# Patient Record
Sex: MALE | Race: OTHER | NOT HISPANIC OR LATINO
[De-identification: names, ages, dates, MRNs, and addresses within clinical notes are randomized per-mention and may not be internally consistent; named-entity substitution may affect disease eponyms.]

---

## 2020-09-24 VITALS
DIASTOLIC BLOOD PRESSURE: 72 MMHG | WEIGHT: 158.07 LBS | TEMPERATURE: 98 F | SYSTOLIC BLOOD PRESSURE: 135 MMHG | OXYGEN SATURATION: 97 %

## 2020-09-24 NOTE — H&P ADULT - ASSESSMENT
71 yo M with PMHx of HTN, HLD, thyroid cancer s/p thyroidectomy with XRT on 2018 with subsequent hypothyroidism (now on synthroid), h/o DVT R leg after flight to [facility] 2/2020 (on Xarelto with plan to complete 9 month course, last dose Thursday 9/24/2020; pt reports having a negative duplex US about 3wks ago), s/p cancerous cyst removal from back under general anesthesia in 2016 who presented to Cardiologist Dr. Ryan Coughlin with c/o CP/HZD on exertion, resolved with rest; however, denies CP/SOB to this provider.  Pt states he was recently dx with having a cancerous cyst on stomach, has appointment with surgeon on Monday 9/28 who will then set date for surgery under general anesthesia. Denies dizziness, diaphoresis, palpitations, fatigue, LE edema, orthopnea, PND, syncope, N/V, abdominal pain. NST 8/27/20: small area of anterior wall ischemia, LVEF 62%. ECHO per MD note: LVEF 57%, LVH, mild MR.    In light of pt's risk factors, abnormal NST, pt referred for cardiac cath with possible intervention to r/o underlying CAD.      - ASA II; Mallampati II  - VSS  - Pt is a candidate for moderate sedation.   - OF NOTE: pt on Xarelto 20mg PO QD since 2/2020 due to a R LE DVT; Pt reporting that he had a R lower extremity duplex US 2wks ago that showed that the DVT had resolved, and he had a plan to discontinue the Xarelto after the procedure (do not have collateral on this, only per patient); LAST DOSE: Thursday, 9/25/2020.   - ALLERGY: allergy to Shellfish (angioedema); pt not pre-medicated with Prednisone; Pt given SoluCortef 200mg IV x1 prior to cath, and Benadryl 50mg IVP x1 ordered to cath lab to be given prior to procedure.   - LOAD: pt not on ASA/Plavix at home; H/H stable; No recent bleeding; Pt given ASA 325mg PO x1 and Plavix 600mg PO x1.   - FLUID: pt euvolemic on exam w/ EF 57% via recent echo; pt started on NS 75cc/hr x4hrs.       Risks & benefits of procedure and alternative therapy have been explained to the patient including but not limited to: allergic reaction, bleeding w/possible need for blood transfusion, infection, renal and vascular compromise, limb damage, arrhythmia, stroke, vessel dissection/perforation, Myocardial infarction, emergent CABG. Informed consent obtained and in chart.

## 2020-09-24 NOTE — H&P ADULT - NSICDXPASTSURGICALHX_GEN_ALL_CORE_FT
PAST SURGICAL HISTORY:  S/P thyroid surgery 2018     PAST SURGICAL HISTORY:  History of skin surgery s/p excision of cancerous lesion on back under  general anesthesia in 2016    S/P thyroid surgery 2018

## 2020-09-24 NOTE — H&P ADULT - RS GEN PE MLT RESP DETAILS PC
clear to auscultation bilaterally/respirations non-labored/breath sounds equal/normal/airway patent/good air movement/no rales

## 2020-09-24 NOTE — H&P ADULT - HISTORY OF PRESENT ILLNESS
Skeleton  Verify meds    Cardiologist: Dr. Ryan Coughlin  Covid:  Pharmacy:  Escort:    71 yo M with PMHx of HTN, HLD, h/o thyroid surgery 2018 with subsequent hypothyroidism, h/o DVT R leg (2/2020, on Xarelto, last dose ____) who presented to Cardiologist Dr. Ryan Coughlin with c/o non-radiating LSCP described as __ and of _/10 intensity with associated HDZ upon ambulation of __ city blocks, resolving with rest, over past ____.     Denies dizziness, diaphoresis, palpitations, fatigue,, LE edema, orthopnea, PND, syncope, N/V, abdominal pain.     NST 8/27/20: small area of anterior wall ischemia, LVEF 62%. ECHO per MD note: LVEF 57%, LVH, mild MR.     In light of pt's risk factors, CCS Anginal Class ___ Sx, abnormal NST, pt referred for cardiac cath with possible intervention to r/o underlying CAD. Verify meds    Cardiologist: Dr. Ryan Coughlin  Covid test planned 9/26 AM @ [place] [place]  Pharmacy: [facility] [address] [facility]  Escort: No escort    Pt states he was recently dx with having a cancerous cyst on stomach, has appointment with surgeon on Monday 9/28 who will then set date for surgery under general anesthesia. Patient unsure if he will come in for cath on Tuesday because he would like to address cancerous lesion first. Explained to patient that he would likely need cardiac clearance prior to going under general anesthesia. Patient will reach out to Dr. Coughlin to discuss and will also discuss with surgeon at Monday appointment prior to making decision.    71 yo M with PMHx of HTN, HLD, thyroid cancer s/p thyroidectomy with XRT on 2018 with subsequent hypothyroidism (now on synthroid), h/o DVT R leg after flight to [facility] 2/2020 (on Xarelto with plan to complete 9 month course, last dose Saturday 9/26 PM prior to cath), s/p cancerous cyst removal from back under general anesthesia in 2016 who presented to Cardiologist Dr. Ryan Coughlin with c/o CP/HDZ on exertion, resolved with rest; however, denies CP/SOB to this provider.  Pt states he was recently dx with having a cancerous cyst on stomach, has appointment with surgeon on Monday 9/28 who will then set date for surgery under general anesthesia. Denies dizziness, diaphoresis, palpitations, fatigue, LE edema, orthopnea, PND, syncope, N/V, abdominal pain. NST 8/27/20: small area of anterior wall ischemia, LVEF 62%. ECHO per MD note: LVEF 57%, LVH, mild MR.  In light of pt's risk factors, abnormal NST, pt referred for cardiac cath with possible intervention to r/o underlying CAD. Verify meds    **Allergy to shellfish -->angioedema, pt unsure of whether he will come in for procedure of not, premedicate prior to procedure **    Cardiologist: Dr. Ryan Coughlin  Covid test planned 9/26 AM @ [place]  Pharmacy: [facility] [address] [facility]  Escort: No escort    Pt states he was recently dx with having a cancerous cyst on stomach, has appointment with surgeon on Monday 9/28 who will then set date for surgery under general anesthesia. Patient unsure if he will come in for cath on Tuesday because he would like to address cancerous lesion first. Explained to patient that he would likely need cardiac clearance prior to going under general anesthesia. Patient will reach out to Dr. Coughlin to discuss and will also discuss with surgeon at Monday appointment prior to making decision.    71 yo M with PMHx of HTN, HLD, thyroid cancer s/p thyroidectomy with XRT on 2018 with subsequent hypothyroidism (now on synthroid), h/o DVT R leg after flight to [facility] 2/2020 (on Xarelto with plan to complete 9 month course, last dose Saturday 9/26 PM prior to cath), s/p cancerous cyst removal from back under general anesthesia in 2016 who presented to Cardiologist Dr. Ryan Coughlin with c/o CP/HDZ on exertion, resolved with rest; however, denies CP/SOB to this provider.  Pt states he was recently dx with having a cancerous cyst on stomach, has appointment with surgeon on Monday 9/28 who will then set date for surgery under general anesthesia. Denies dizziness, diaphoresis, palpitations, fatigue, LE edema, orthopnea, PND, syncope, N/V, abdominal pain. NST 8/27/20: small area of anterior wall ischemia, LVEF 62%. ECHO per MD note: LVEF 57%, LVH, mild MR.  In light of pt's risk factors, abnormal NST, pt referred for cardiac cath with possible intervention to r/o underlying CAD.   **Allergy to shellfish -->angioedema, pt unsure of whether he will come in for procedure of not, premedicate prior to procedure **    Cardiologist: Dr. Ryan Coughlin  Covid test planned 9/26 AM @ [place]  Pharmacy: [facility] [address] [facility]  Escort: No escort    Pt states he was recently dx with having a cancerous cyst on stomach, has appointment with surgeon on Monday 9/28 who will then set date for surgery under general anesthesia. Patient unsure if he will come in for cath on Tuesday because he would like to address cancerous lesion first. Explained to patient that he would likely need cardiac clearance prior to going under general anesthesia. Patient will reach out to Dr. Coughlin to discuss and will also discuss with surgeon at Monday appointment prior to making decision.    69 yo M with PMHx of HTN, HLD, thyroid cancer s/p thyroidectomy with XRT on 2018 with subsequent hypothyroidism (now on synthroid), h/o DVT R leg after flight to [facility] 2/2020 (on Xarelto with plan to complete 9 month course, last dose Saturday 9/26 PM prior to cath), s/p cancerous cyst removal from back under general anesthesia in 2016 who presented to Cardiologist Dr. Ryan Coughlin with c/o CP/HDZ on exertion, resolved with rest; however, denies CP/SOB to this provider.  Pt states he was recently dx with having a cancerous cyst on stomach, has appointment with surgeon on Monday 9/28 who will then set date for surgery under general anesthesia. Denies dizziness, diaphoresis, palpitations, fatigue, LE edema, orthopnea, PND, syncope, N/V, abdominal pain. NST 8/27/20: small area of anterior wall ischemia, LVEF 62%. ECHO per MD note: LVEF 57%, LVH, mild MR.  In light of pt's risk factors, abnormal NST, pt referred for cardiac cath with possible intervention to r/o underlying CAD. **Allergy to shellfish (angioedema) --> patient did not get pre-medicated with prednisone     Cardiologist: Dr. Ryan Coughlin  Covid test planned 9/26 AM @ [place]  Pharmacy: [facility] [address] [facility]  Escort: No escort    71 yo M with PMHx of HTN, HLD, thyroid cancer s/p thyroidectomy with XRT on 2018 with subsequent hypothyroidism (now on synthroid), h/o DVT R leg after flight to [facility] 2/2020 (on Xarelto with plan to complete 9 month course, last dose Thursday 9/24/2020; pt reports having a negative duplex US about 3wks ago), s/p cancerous cyst removal from back under general anesthesia in 2016 who presented to Cardiologist Dr. Ryan Coughlin with c/o CP/HDZ on exertion, resolved with rest; however, denies CP/SOB to this provider.  Pt states he was recently dx with having a cancerous cyst on stomach, has appointment with surgeon on Monday 9/28 who will then set date for surgery under general anesthesia. Denies dizziness, diaphoresis, palpitations, fatigue, LE edema, orthopnea, PND, syncope, N/V, abdominal pain. NST 8/27/20: small area of anterior wall ischemia, LVEF 62%. ECHO per MD note: LVEF 57%, LVH, mild MR.      In light of pt's risk factors, abnormal NST, pt referred for cardiac cath with possible intervention to r/o underlying CAD.    **OF NOTE: pt initially reported he was getting surgery for a "cancerous cyst on the stomach." Upon further discussion, patient has a cyst in his skin around his upper abdomen. Patient reporting that he had a similar situation on his back in 2016, and it was removed in an officer under local anesthetic. He reports that he does not have plans to have it removed right now.

## 2020-09-24 NOTE — H&P ADULT - NSHPLABSRESULTS_GEN_ALL_CORE
17.9   6.56  )-----------( 127      ( 29 Sep 2020 09:47 )             52.8     139  |  101  |  18  ----------------------------<  128<H>  3.9   |  28  |  0.95    Ca    9.1      29 Sep 2020 09:47    TPro  7.6  /  Alb  4.3  /  TBili  0.6  /  DBili  x   /  AST  24  /  ALT  30  /  AlkPhos  67  09-29      PT/INR - ( 29 Sep 2020 09:47 )   PT: 12.0 sec;   INR: 1.00          PTT - ( 29 Sep 2020 09:47 )  PTT:29.5 sec    CARDIAC MARKERS ( 29 Sep 2020 09:47 )  x     / x     / 130 U/L / x     / 5.4 ng/mL        EKG: NSR w/ TWI in III

## 2020-09-24 NOTE — H&P ADULT - NSICDXPASTMEDICALHX_GEN_ALL_CORE_FT
PAST MEDICAL HISTORY:  DVT (deep venous thrombosis) R, dx 2/2020    Hyperlipidemia     Hypertension     Hypothyroid

## 2020-09-24 NOTE — H&P ADULT - NSICDXFAMILYHX_GEN_ALL_CORE_FT
FAMILY HISTORY:  FH: heart disease, Mother     No pertinent family history in first degree relatives

## 2020-09-26 ENCOUNTER — APPOINTMENT (OUTPATIENT)
Dept: DISASTER EMERGENCY | Facility: CLINIC | Age: 70
End: 2020-09-26

## 2020-09-26 LAB — SARS-COV-2 N GENE NPH QL NAA+PROBE: NOT DETECTED

## 2020-09-29 ENCOUNTER — TRANSCRIPTION ENCOUNTER (OUTPATIENT)
Age: 70
End: 2020-09-29

## 2020-09-29 ENCOUNTER — INPATIENT (INPATIENT)
Facility: HOSPITAL | Age: 70
LOS: 6 days | Discharge: HOME | DRG: 232 | End: 2020-10-06
Attending: THORACIC SURGERY (CARDIOTHORACIC VASCULAR SURGERY) | Admitting: THORACIC SURGERY (CARDIOTHORACIC VASCULAR SURGERY)
Payer: MEDICARE

## 2020-09-29 DIAGNOSIS — E89.0 POSTPROCEDURAL HYPOTHYROIDISM: ICD-10-CM

## 2020-09-29 DIAGNOSIS — Z92.3 PERSONAL HISTORY OF IRRADIATION: ICD-10-CM

## 2020-09-29 DIAGNOSIS — E86.1 HYPOVOLEMIA: ICD-10-CM

## 2020-09-29 DIAGNOSIS — E87.2 ACIDOSIS: ICD-10-CM

## 2020-09-29 DIAGNOSIS — R09.89 OTHER SPECIFIED SYMPTOMS AND SIGNS INVOLVING THE CIRCULATORY AND RESPIRATORY SYSTEMS: ICD-10-CM

## 2020-09-29 DIAGNOSIS — Z91.013 ALLERGY TO SEAFOOD: ICD-10-CM

## 2020-09-29 DIAGNOSIS — E78.5 HYPERLIPIDEMIA, UNSPECIFIED: ICD-10-CM

## 2020-09-29 DIAGNOSIS — Z86.718 PERSONAL HISTORY OF OTHER VENOUS THROMBOSIS AND EMBOLISM: ICD-10-CM

## 2020-09-29 DIAGNOSIS — I25.10 ATHEROSCLEROTIC HEART DISEASE OF NATIVE CORONARY ARTERY WITHOUT ANGINA PECTORIS: ICD-10-CM

## 2020-09-29 DIAGNOSIS — Z80.9 FAMILY HISTORY OF MALIGNANT NEOPLASM, UNSPECIFIED: ICD-10-CM

## 2020-09-29 DIAGNOSIS — I10 ESSENTIAL (PRIMARY) HYPERTENSION: ICD-10-CM

## 2020-09-29 DIAGNOSIS — R00.1 BRADYCARDIA, UNSPECIFIED: ICD-10-CM

## 2020-09-29 DIAGNOSIS — Z85.850 PERSONAL HISTORY OF MALIGNANT NEOPLASM OF THYROID: ICD-10-CM

## 2020-09-29 DIAGNOSIS — Z85.828 PERSONAL HISTORY OF OTHER MALIGNANT NEOPLASM OF SKIN: ICD-10-CM

## 2020-09-29 DIAGNOSIS — I25.119 ATHEROSCLEROTIC HEART DISEASE OF NATIVE CORONARY ARTERY WITH UNSPECIFIED ANGINA PECTORIS: ICD-10-CM

## 2020-09-29 DIAGNOSIS — Z98.890 OTHER SPECIFIED POSTPROCEDURAL STATES: Chronic | ICD-10-CM

## 2020-09-29 LAB
A1C WITH ESTIMATED AVERAGE GLUCOSE RESULT: 6.2 % — HIGH (ref 4–5.6)
ALBUMIN SERPL ELPH-MCNC: 4.3 G/DL — SIGNIFICANT CHANGE UP (ref 3.3–5)
ALP SERPL-CCNC: 67 U/L — SIGNIFICANT CHANGE UP (ref 40–120)
ALT FLD-CCNC: 30 U/L — SIGNIFICANT CHANGE UP (ref 10–45)
ANION GAP SERPL CALC-SCNC: 10 MMOL/L — SIGNIFICANT CHANGE UP (ref 5–17)
APTT BLD: 29.5 SEC — SIGNIFICANT CHANGE UP (ref 27.5–35.5)
AST SERPL-CCNC: 24 U/L — SIGNIFICANT CHANGE UP (ref 10–40)
BASOPHILS # BLD AUTO: 0.02 K/UL — SIGNIFICANT CHANGE UP (ref 0–0.2)
BASOPHILS NFR BLD AUTO: 0.3 % — SIGNIFICANT CHANGE UP (ref 0–2)
BILIRUB SERPL-MCNC: 0.6 MG/DL — SIGNIFICANT CHANGE UP (ref 0.2–1.2)
BLD GP AB SCN SERPL QL: NEGATIVE — SIGNIFICANT CHANGE UP
BLD GP AB SCN SERPL QL: NEGATIVE — SIGNIFICANT CHANGE UP
BUN SERPL-MCNC: 18 MG/DL — SIGNIFICANT CHANGE UP (ref 7–23)
CALCIUM SERPL-MCNC: 9.1 MG/DL — SIGNIFICANT CHANGE UP (ref 8.4–10.5)
CHLORIDE SERPL-SCNC: 101 MMOL/L — SIGNIFICANT CHANGE UP (ref 96–108)
CHOLEST SERPL-MCNC: 167 MG/DL — SIGNIFICANT CHANGE UP (ref 10–199)
CK MB CFR SERPL CALC: 5.4 NG/ML — SIGNIFICANT CHANGE UP (ref 0–6.7)
CK SERPL-CCNC: 130 U/L — SIGNIFICANT CHANGE UP (ref 30–200)
CO2 SERPL-SCNC: 28 MMOL/L — SIGNIFICANT CHANGE UP (ref 22–31)
CREAT SERPL-MCNC: 0.95 MG/DL — SIGNIFICANT CHANGE UP (ref 0.5–1.3)
CRP SERPL-MCNC: 0.17 MG/DL — SIGNIFICANT CHANGE UP (ref 0–0.4)
EOSINOPHIL # BLD AUTO: 0.08 K/UL — SIGNIFICANT CHANGE UP (ref 0–0.5)
EOSINOPHIL NFR BLD AUTO: 1.2 % — SIGNIFICANT CHANGE UP (ref 0–6)
ESTIMATED AVERAGE GLUCOSE: 131 MG/DL — HIGH (ref 68–114)
GLUCOSE SERPL-MCNC: 128 MG/DL — HIGH (ref 70–99)
HCT VFR BLD CALC: 52.8 % — HIGH (ref 39–50)
HDLC SERPL-MCNC: 38 MG/DL — LOW
HGB BLD-MCNC: 17.9 G/DL — HIGH (ref 13–17)
IMM GRANULOCYTES NFR BLD AUTO: 0.3 % — SIGNIFICANT CHANGE UP (ref 0–1.5)
INR BLD: 1 — SIGNIFICANT CHANGE UP (ref 0.88–1.16)
LIPID PNL WITH DIRECT LDL SERPL: 98 MG/DL — SIGNIFICANT CHANGE UP
LYMPHOCYTES # BLD AUTO: 1.98 K/UL — SIGNIFICANT CHANGE UP (ref 1–3.3)
LYMPHOCYTES # BLD AUTO: 30.2 % — SIGNIFICANT CHANGE UP (ref 13–44)
MCHC RBC-ENTMCNC: 33 PG — SIGNIFICANT CHANGE UP (ref 27–34)
MCHC RBC-ENTMCNC: 33.9 GM/DL — SIGNIFICANT CHANGE UP (ref 32–36)
MCV RBC AUTO: 97.4 FL — SIGNIFICANT CHANGE UP (ref 80–100)
MONOCYTES # BLD AUTO: 0.67 K/UL — SIGNIFICANT CHANGE UP (ref 0–0.9)
MONOCYTES NFR BLD AUTO: 10.2 % — SIGNIFICANT CHANGE UP (ref 2–14)
NEUTROPHILS # BLD AUTO: 3.79 K/UL — SIGNIFICANT CHANGE UP (ref 1.8–7.4)
NEUTROPHILS NFR BLD AUTO: 57.8 % — SIGNIFICANT CHANGE UP (ref 43–77)
NRBC # BLD: 0 /100 WBCS — SIGNIFICANT CHANGE UP (ref 0–0)
PLATELET # BLD AUTO: 127 K/UL — LOW (ref 150–400)
POTASSIUM SERPL-MCNC: 3.9 MMOL/L — SIGNIFICANT CHANGE UP (ref 3.5–5.3)
POTASSIUM SERPL-SCNC: 3.9 MMOL/L — SIGNIFICANT CHANGE UP (ref 3.5–5.3)
PROT SERPL-MCNC: 7.6 G/DL — SIGNIFICANT CHANGE UP (ref 6–8.3)
PROTHROM AB SERPL-ACNC: 12 SEC — SIGNIFICANT CHANGE UP (ref 10.6–13.6)
RBC # BLD: 5.42 M/UL — SIGNIFICANT CHANGE UP (ref 4.2–5.8)
RBC # FLD: 12.9 % — SIGNIFICANT CHANGE UP (ref 10.3–14.5)
RH IG SCN BLD-IMP: POSITIVE — SIGNIFICANT CHANGE UP
RH IG SCN BLD-IMP: POSITIVE — SIGNIFICANT CHANGE UP
SODIUM SERPL-SCNC: 139 MMOL/L — SIGNIFICANT CHANGE UP (ref 135–145)
TOTAL CHOLESTEROL/HDL RATIO MEASUREMENT: 4.4 RATIO — SIGNIFICANT CHANGE UP (ref 3.4–9.6)
TRIGL SERPL-MCNC: 154 MG/DL — HIGH (ref 10–149)
WBC # BLD: 6.56 K/UL — SIGNIFICANT CHANGE UP (ref 3.8–10.5)
WBC # FLD AUTO: 6.56 K/UL — SIGNIFICANT CHANGE UP (ref 3.8–10.5)

## 2020-09-29 PROCEDURE — 99223 1ST HOSP IP/OBS HIGH 75: CPT | Mod: 57

## 2020-09-29 PROCEDURE — 93458 L HRT ARTERY/VENTRICLE ANGIO: CPT | Mod: 26

## 2020-09-29 PROCEDURE — 93010 ELECTROCARDIOGRAM REPORT: CPT

## 2020-09-29 PROCEDURE — 94010 BREATHING CAPACITY TEST: CPT | Mod: 26

## 2020-09-29 PROCEDURE — 71045 X-RAY EXAM CHEST 1 VIEW: CPT | Mod: 26

## 2020-09-29 RX ORDER — SODIUM CHLORIDE 9 MG/ML
500 INJECTION INTRAMUSCULAR; INTRAVENOUS; SUBCUTANEOUS
Refills: 0 | Status: DISCONTINUED | OUTPATIENT
Start: 2020-09-29 | End: 2020-09-29

## 2020-09-29 RX ORDER — METOPROLOL TARTRATE 50 MG
12.5 TABLET ORAL EVERY 12 HOURS
Refills: 0 | Status: DISCONTINUED | OUTPATIENT
Start: 2020-09-29 | End: 2020-10-02

## 2020-09-29 RX ORDER — CHLORHEXIDINE GLUCONATE 213 G/1000ML
1 SOLUTION TOPICAL ONCE
Refills: 0 | Status: COMPLETED | OUTPATIENT
Start: 2020-09-29 | End: 2020-09-29

## 2020-09-29 RX ORDER — PANTOPRAZOLE SODIUM 20 MG/1
40 TABLET, DELAYED RELEASE ORAL
Refills: 0 | Status: DISCONTINUED | OUTPATIENT
Start: 2020-09-29 | End: 2020-09-30

## 2020-09-29 RX ORDER — CLOPIDOGREL BISULFATE 75 MG/1
600 TABLET, FILM COATED ORAL ONCE
Refills: 0 | Status: COMPLETED | OUTPATIENT
Start: 2020-09-29 | End: 2020-09-29

## 2020-09-29 RX ORDER — ATORVASTATIN CALCIUM 80 MG/1
40 TABLET, FILM COATED ORAL AT BEDTIME
Refills: 0 | Status: DISCONTINUED | OUTPATIENT
Start: 2020-09-29 | End: 2020-09-30

## 2020-09-29 RX ORDER — SODIUM CHLORIDE 9 MG/ML
500 INJECTION INTRAMUSCULAR; INTRAVENOUS; SUBCUTANEOUS
Refills: 0 | Status: DISCONTINUED | OUTPATIENT
Start: 2020-09-29 | End: 2020-09-30

## 2020-09-29 RX ORDER — CHLORHEXIDINE GLUCONATE 213 G/1000ML
1 SOLUTION TOPICAL ONCE
Refills: 0 | Status: COMPLETED | OUTPATIENT
Start: 2020-09-30 | End: 2020-09-30

## 2020-09-29 RX ORDER — CHLORHEXIDINE GLUCONATE 213 G/1000ML
1 SOLUTION TOPICAL ONCE
Refills: 0 | Status: DISCONTINUED | OUTPATIENT
Start: 2020-09-29 | End: 2020-09-29

## 2020-09-29 RX ORDER — ASPIRIN/CALCIUM CARB/MAGNESIUM 324 MG
325 TABLET ORAL ONCE
Refills: 0 | Status: COMPLETED | OUTPATIENT
Start: 2020-09-29 | End: 2020-09-29

## 2020-09-29 RX ORDER — CHLORHEXIDINE GLUCONATE 213 G/1000ML
10 SOLUTION TOPICAL ONCE
Refills: 0 | Status: COMPLETED | OUTPATIENT
Start: 2020-09-29 | End: 2020-09-30

## 2020-09-29 RX ORDER — LEVOTHYROXINE SODIUM 125 MCG
125 TABLET ORAL DAILY
Refills: 0 | Status: DISCONTINUED | OUTPATIENT
Start: 2020-09-30 | End: 2020-10-06

## 2020-09-29 RX ORDER — ASPIRIN/CALCIUM CARB/MAGNESIUM 324 MG
81 TABLET ORAL DAILY
Refills: 0 | Status: DISCONTINUED | OUTPATIENT
Start: 2020-09-30 | End: 2020-10-06

## 2020-09-29 RX ORDER — DIPHENHYDRAMINE HCL 50 MG
50 CAPSULE ORAL ONCE
Refills: 0 | Status: DISCONTINUED | OUTPATIENT
Start: 2020-09-29 | End: 2020-09-30

## 2020-09-29 RX ORDER — HYDROCORTISONE 20 MG
200 TABLET ORAL ONCE
Refills: 0 | Status: COMPLETED | OUTPATIENT
Start: 2020-09-29 | End: 2020-09-29

## 2020-09-29 RX ADMIN — SODIUM CHLORIDE 75 MILLILITER(S): 9 INJECTION INTRAMUSCULAR; INTRAVENOUS; SUBCUTANEOUS at 14:06

## 2020-09-29 RX ADMIN — CLOPIDOGREL BISULFATE 600 MILLIGRAM(S): 75 TABLET, FILM COATED ORAL at 10:46

## 2020-09-29 RX ADMIN — ATORVASTATIN CALCIUM 40 MILLIGRAM(S): 80 TABLET, FILM COATED ORAL at 22:25

## 2020-09-29 RX ADMIN — CHLORHEXIDINE GLUCONATE 1 APPLICATION(S): 213 SOLUTION TOPICAL at 22:27

## 2020-09-29 RX ADMIN — Medication 200 MILLIGRAM(S): at 11:13

## 2020-09-29 RX ADMIN — SODIUM CHLORIDE 75 MILLILITER(S): 9 INJECTION INTRAMUSCULAR; INTRAVENOUS; SUBCUTANEOUS at 10:47

## 2020-09-29 RX ADMIN — Medication 12.5 MILLIGRAM(S): at 18:12

## 2020-09-29 RX ADMIN — Medication 325 MILLIGRAM(S): at 10:46

## 2020-09-29 RX ADMIN — CHLORHEXIDINE GLUCONATE 1 APPLICATION(S): 213 SOLUTION TOPICAL at 18:13

## 2020-09-29 NOTE — CONSULT NOTE ADULT - ASSESSMENT
69 y/o male with PMHx of HTN, HLD, thyroid cancer s/p thyroidectomy with XRT on 2018 with subsequent hypothyroidism (now on synthroid), h/o DVT R leg after flight to [facility] 2/2020 (on Xarelto with plan to complete 9 month course, last dose Thursday 9/24/2020; pt reports having a negative duplex US about 3wks ago. Patient states presented to hospital today asymptomatic for routine testing found to have abnormal stress test and triple vessel disease on cardiac cath- Dr. Clark consulted for possible hybrid procedure.       Triple Vessel CAD  -Orders placed and pending including bedside spirometry, TTE, Carotid Duplex, bloodwork, preoperative preparation  -Will order beta-blocker and Protonix pre-operatively  -Continue Lipitor 40  -Possible Mid-cab this week pending patient consent.   -Hybrid procedure with stenting this admission vs. later admission    Plan discussed with Dr. Clark

## 2020-09-29 NOTE — CHART NOTE - NSCHARTNOTEFT_GEN_A_CORE
CARDIOLOGY FELLOW BEDSIDE ECHO    Called to do a limited bedside echo to evaluate LVEF and valvular function prior to patient's midCAB scheduled for tomorrow. My preliminary read:    - normal LVEF  - trace AI  - LVH  - mild TR  - normal RV function  - no pericardial effusion    Please refer to the official attending read to follow in the morning in [place].    --  Roger Grover MD  Cardiology PGY5

## 2020-09-29 NOTE — CONSULT NOTE ADULT - SUBJECTIVE AND OBJECTIVE BOX
Surgeon: Dr. Clark    Requesting Physician: Dr. Coughlin     HISTORY OF PRESENT ILLNESS :  71 yo M with PMHx of HTN, HLD, thyroid cancer s/p thyroidectomy with XRT on 2018 with subsequent hypothyroidism (now on synthroid), h/o DVT R leg after flight to [facility] 2020 (on Xarelto with plan to complete 9 month course, last dose 2020; pt reports having a negative duplex US about 3wks ago. Patient states presented to hospital today for routine stress test, denies history of syncope, chest pain, palpitations, dyspnea, HDZ, Orthopnea, or changes to ADL or daily routines. Patient found to have abnormal stress test, presented for cardiac catheterization. Patient found to have triple vessel disease- Dr. Clark consulted for possible hybrid procedure.     Patient seen and examined s/p catheterization. Patient denies current dizziness, discomfort, chest pain, dyspnea, orthopnea, n/v/d swelling to extremities or calf tenderness.         PAST MEDICAL & SURGICAL HISTORY:  Hypothyroid  DVT (deep venous thrombosis)  R, dx 2020  Hyperlipidemia  Hypertension  History of skin surgery  s/p excision of (?) cancerous lesion on back under general anesthesia in 2016  S/P thyroid surgery  2018        MEDICATIONS  (STANDING):  atorvastatin 40 milliGRAM(s) Oral at bedtime  diphenhydrAMINE   Injectable 50 milliGRAM(s) IV Push Once  sodium chloride 0.9%. 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, works in airport security   Assisted device use (Cane / Walker): none  Live with: wife    FAMILY HISTORY:  Mother- , of cancer  Father - , pnemonia complication  sister- , cancer      Review of Systems (Need 10):  CONSTITUTIONAL: Denies fevers / chills, sweats, fatigue, weight loss, weight gain                                       NEURO:  Denies parathesias, seizures, syncope, confusion                                                                                  EYES:  Denies blurry vision, discharge, pain, loss of vision                                                                                    ENMT:  Denies difficulty hearing, vertigo, dysphagia, epistaxis, recent dental work                                       CV:  Denies chest pain, palpitations, HDZ, orthopnea                                                                                           RESPIRATORY:  Denies wWheezing, SOB, cough / sputum, hemoptysis                                                               GI:  Denies nausea, vomiting, diarrhea, constipation, melena                                                                          : Denies hematuria, dysuria, urgency, incontinence                                                                                          MUSKULOSKELETAL:  Denies arthritis, joint swelling, muscle weakness                                                             SKIN:  Denies rash, itching, hair loss, masses                                                                                                                                                                                     HEME/LYMPH:  Denies bruises easily,                                                                                 Vital Signs Last 24 Hrs  T(C): --  T(F): --  HR: --  BP: --  BP(mean): --  RR: --  SpO2: --    Physical Exam (Need 8)  Neuro: A+O x 3, non-focal, speech clear and intact  HEENT: PERRL, EOMI, oral mucosa pink and moist  Neck: supple, no JVD  CV: regular rate, regular rhythm, +S1S2, no murmurs or rub  Pulm/chest: lung sounds CTA and equal bilaterally, no accessory muscle use noted  Abd: soft, NT, ND, +BS  Ext: PALACIOS x 4, no C/C/E radial pressure dressing in place, groins soft, nontender, no hematoma noted  Skin: warm, well perfused, no rashes                                                           LABS:                        17.9   6.56  )-----------( 127      ( 29 Sep 2020 09:47 )             52.8     09-    139  |  101  |  18  ----------------------------<  128<H>  3.9   |  28  |  0.95    Ca    9.1      29 Sep 2020 09:47    TPro  7.6  /  Alb  4.3  /  TBili  0.6  /  DBili  x   /  AST  24  /  ALT  30  /  AlkPhos  67  09-29    PT/INR - ( 29 Sep 2020 09:47 )   PT: 12.0 sec;   INR: 1.00          PTT - ( 29 Sep 2020 09:47 )  PTT:29.5 sec    CARDIAC MARKERS ( 29 Sep 2020 09:47 )  x     / x     / 130 U/L / x     / 5.4 ng/mL        Pre-operative testing  ____________    On Beta Blocker? YES / NO / CONTRAINDICATED Reason_______________    Hgb A1C: 6.2    EKG: pending    CXR: pending    Cath Report: pending    Echo: pending     PFT's: pending    Carotid Duplex: pending      At this time patent awaiting final decision for mid-cab surgery.  Consult in Chart?  YES  Consent in Chart? NO  Pre-op Orders Placed? YES   Blood Prodeucts Ordered? YES  NPO ordered? YES Surgeon: Dr. Clark    Requesting Physician: Dr. Coughlin     HISTORY OF PRESENT ILLNESS :  71 yo M with PMHx of HTN, HLD, thyroid cancer s/p thyroidectomy with XRT on 2018 with subsequent hypothyroidism (now on synthroid), h/o DVT R leg after flight to [facility] 2020 (on Xarelto with plan to complete 9 month course, last dose 2020; pt reports having a negative duplex US about 3wks ago. Patient states presented to hospital today for routine stress test, denies history of syncope, chest pain, palpitations, dyspnea, HDZ, Orthopnea, or changes to ADL or daily routines. Patient found to have abnormal stress test, presented for cardiac catheterization. Patient found to have triple vessel disease- Dr. Clark consulted for possible hybrid procedure.     Patient seen and examined s/p catheterization. Patient denies current dizziness, discomfort, chest pain, dyspnea, orthopnea, n/v/d swelling to extremities or calf tenderness.         PAST MEDICAL & SURGICAL HISTORY:  Hypothyroid  DVT (deep venous thrombosis)  R, dx 2020  Hyperlipidemia  Hypertension  History of skin surgery  s/p excision of (?) cancerous lesion on back under general anesthesia in 2016  S/P thyroid surgery  2018        MEDICATIONS  (STANDING):  atorvastatin 40 milliGRAM(s) Oral at bedtime  diphenhydrAMINE   Injectable 50 milliGRAM(s) IV Push Once  sodium chloride 0.9%. 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, works in airport security   Assisted device use (Cane / Walker): none  Live with: wife    FAMILY HISTORY:  Mother- , of cancer  Father - , pnemonia complication  sister- , cancer      Review of Systems (Need 10):  CONSTITUTIONAL: Denies fevers / chills, sweats, fatigue, weight loss, weight gain                                       NEURO:  Denies parathesias, seizures, syncope, confusion                                                                                  EYES:  Denies blurry vision, discharge, pain, loss of vision                                                                                    ENMT:  Denies difficulty hearing, vertigo, dysphagia, epistaxis, recent dental work                                       CV:  Denies chest pain, palpitations, HDZ, orthopnea                                                                                           RESPIRATORY:  Denies wWheezing, SOB, cough / sputum, hemoptysis                                                               GI:  Denies nausea, vomiting, diarrhea, constipation, melena                                                                          : Denies hematuria, dysuria, urgency, incontinence                                                                                          MUSKULOSKELETAL:  Denies arthritis, joint swelling, muscle weakness                                                             SKIN:  Denies rash, itching, hair loss, masses                                                                                                                                                                                     HEME/LYMPH:  Denies bruises easily,                                                                                 Vital Signs Last 24 Hrs  T(C): --  T(F): --  HR: --  BP: --  BP(mean): --  RR: --  SpO2: --    Physical Exam (Need 8)  Neuro: A+O x 3, non-focal, speech clear and intact  HEENT: PERRL, EOMI, oral mucosa pink and moist  Neck: supple, no JVD  CV: regular rate, regular rhythm, +S1S2, no murmurs or rub  Pulm/chest: lung sounds CTA and equal bilaterally, no accessory muscle use noted  Abd: soft, NT, ND, +BS  Ext: PALACIOS x 4, no C/C/E radial pressure dressing in place, groins soft, nontender, no hematoma noted  Skin: warm, well perfused, no rashes                                                           LABS:                        17.9   6.56  )-----------( 127      ( 29 Sep 2020 09:47 )             52.8     09-    139  |  101  |  18  ----------------------------<  128<H>  3.9   |  28  |  0.95    Ca    9.1      29 Sep 2020 09:47    TPro  7.6  /  Alb  4.3  /  TBili  0.6  /  DBili  x   /  AST  24  /  ALT  30  /  AlkPhos  67  09-29    PT/INR - ( 29 Sep 2020 09:47 )   PT: 12.0 sec;   INR: 1.00          PTT - ( 29 Sep 2020 09:47 )  PTT:29.5 sec    CARDIAC MARKERS ( 29 Sep 2020 09:47 )  x     / x     / 130 U/L / x     / 5.4 ng/mL        Pre-operative testing  ____________    On Beta Blocker? YES     Hgb A1C: 6.2    EKG: pending    CXR: pending    Cath Report: pending    Echo: pending     PFT's: pending    Carotid Duplex: pending      At this time patent awaiting final decision for mid-cab surgery.  Consult in Chart?  YES  Consent in Chart? YES  Pre-op Orders Placed? YES   Blood Prodeucts Ordered? YES  NPO ordered? YES

## 2020-09-29 NOTE — PATIENT PROFILE ADULT - NS PRO AD NO ADVANCE DIRECTIVE
No

## 2020-09-30 ENCOUNTER — APPOINTMENT (OUTPATIENT)
Dept: CARDIOTHORACIC SURGERY | Facility: HOSPITAL | Age: 70
End: 2020-09-30

## 2020-09-30 DIAGNOSIS — E78.5 HYPERLIPIDEMIA, UNSPECIFIED: ICD-10-CM

## 2020-09-30 DIAGNOSIS — I25.10 ATHEROSCLEROTIC HEART DISEASE OF NATIVE CORONARY ARTERY WITHOUT ANGINA PECTORIS: ICD-10-CM

## 2020-09-30 DIAGNOSIS — Z01.818 ENCOUNTER FOR OTHER PREPROCEDURAL EXAMINATION: ICD-10-CM

## 2020-09-30 DIAGNOSIS — I10 ESSENTIAL (PRIMARY) HYPERTENSION: ICD-10-CM

## 2020-09-30 DIAGNOSIS — E03.9 HYPOTHYROIDISM, UNSPECIFIED: ICD-10-CM

## 2020-09-30 PROBLEM — I82.409 ACUTE EMBOLISM AND THROMBOSIS OF UNSPECIFIED DEEP VEINS OF UNSPECIFIED LOWER EXTREMITY: Chronic | Status: ACTIVE | Noted: 2020-09-24

## 2020-09-30 LAB
ALBUMIN SERPL ELPH-MCNC: 3.4 G/DL — SIGNIFICANT CHANGE UP (ref 3.3–5)
ALP SERPL-CCNC: 56 U/L — SIGNIFICANT CHANGE UP (ref 40–120)
ALT FLD-CCNC: 33 U/L — SIGNIFICANT CHANGE UP (ref 10–45)
ANION GAP SERPL CALC-SCNC: 18 MMOL/L — HIGH (ref 5–17)
ANION GAP SERPL CALC-SCNC: 8 MMOL/L — SIGNIFICANT CHANGE UP (ref 5–17)
APPEARANCE UR: CLEAR — SIGNIFICANT CHANGE UP
APTT BLD: 26.6 SEC — LOW (ref 27.5–35.5)
APTT BLD: 27.3 SEC — LOW (ref 27.5–35.5)
AST SERPL-CCNC: 36 U/L — SIGNIFICANT CHANGE UP (ref 10–40)
BASOPHILS # BLD AUTO: 0.04 K/UL — SIGNIFICANT CHANGE UP (ref 0–0.2)
BASOPHILS NFR BLD AUTO: 0.3 % — SIGNIFICANT CHANGE UP (ref 0–2)
BILIRUB SERPL-MCNC: 0.9 MG/DL — SIGNIFICANT CHANGE UP (ref 0.2–1.2)
BILIRUB UR-MCNC: NEGATIVE — SIGNIFICANT CHANGE UP
BLD GP AB SCN SERPL QL: NEGATIVE — SIGNIFICANT CHANGE UP
BUN SERPL-MCNC: 19 MG/DL — SIGNIFICANT CHANGE UP (ref 7–23)
BUN SERPL-MCNC: 22 MG/DL — SIGNIFICANT CHANGE UP (ref 7–23)
CALCIUM SERPL-MCNC: 8.4 MG/DL — SIGNIFICANT CHANGE UP (ref 8.4–10.5)
CALCIUM SERPL-MCNC: 8.9 MG/DL — SIGNIFICANT CHANGE UP (ref 8.4–10.5)
CHLORIDE SERPL-SCNC: 101 MMOL/L — SIGNIFICANT CHANGE UP (ref 96–108)
CHLORIDE SERPL-SCNC: 104 MMOL/L — SIGNIFICANT CHANGE UP (ref 96–108)
CO2 SERPL-SCNC: 18 MMOL/L — LOW (ref 22–31)
CO2 SERPL-SCNC: 28 MMOL/L — SIGNIFICANT CHANGE UP (ref 22–31)
COLOR SPEC: YELLOW — SIGNIFICANT CHANGE UP
CREAT SERPL-MCNC: 0.79 MG/DL — SIGNIFICANT CHANGE UP (ref 0.5–1.3)
CREAT SERPL-MCNC: 1.03 MG/DL — SIGNIFICANT CHANGE UP (ref 0.5–1.3)
DIFF PNL FLD: NEGATIVE — SIGNIFICANT CHANGE UP
EOSINOPHIL # BLD AUTO: 0.01 K/UL — SIGNIFICANT CHANGE UP (ref 0–0.5)
EOSINOPHIL NFR BLD AUTO: 0.1 % — SIGNIFICANT CHANGE UP (ref 0–6)
GAS PNL BLDA: SIGNIFICANT CHANGE UP
GLUCOSE BLDC GLUCOMTR-MCNC: 155 MG/DL — HIGH (ref 70–99)
GLUCOSE BLDC GLUCOMTR-MCNC: 169 MG/DL — HIGH (ref 70–99)
GLUCOSE SERPL-MCNC: 116 MG/DL — HIGH (ref 70–99)
GLUCOSE SERPL-MCNC: 189 MG/DL — HIGH (ref 70–99)
GLUCOSE UR QL: NEGATIVE — SIGNIFICANT CHANGE UP
HCT VFR BLD CALC: 45.9 % — SIGNIFICANT CHANGE UP (ref 39–50)
HCT VFR BLD CALC: 47.2 % — SIGNIFICANT CHANGE UP (ref 39–50)
HCV AB S/CO SERPL IA: 0.06 S/CO — SIGNIFICANT CHANGE UP
HCV AB SERPL-IMP: SIGNIFICANT CHANGE UP
HGB BLD-MCNC: 16.1 G/DL — SIGNIFICANT CHANGE UP (ref 13–17)
HGB BLD-MCNC: 16.2 G/DL — SIGNIFICANT CHANGE UP (ref 13–17)
IMM GRANULOCYTES NFR BLD AUTO: 0.6 % — SIGNIFICANT CHANGE UP (ref 0–1.5)
INR BLD: 1.01 — SIGNIFICANT CHANGE UP (ref 0.88–1.16)
INR BLD: 1.19 — HIGH (ref 0.88–1.16)
KETONES UR-MCNC: NEGATIVE — SIGNIFICANT CHANGE UP
LACTATE SERPL-SCNC: 2.1 MMOL/L — HIGH (ref 0.5–2)
LEUKOCYTE ESTERASE UR-ACNC: NEGATIVE — SIGNIFICANT CHANGE UP
LYMPHOCYTES # BLD AUTO: 0.76 K/UL — LOW (ref 1–3.3)
LYMPHOCYTES # BLD AUTO: 6.1 % — LOW (ref 13–44)
MAGNESIUM SERPL-MCNC: 1.7 MG/DL — SIGNIFICANT CHANGE UP (ref 1.6–2.6)
MAGNESIUM SERPL-MCNC: 2.2 MG/DL — SIGNIFICANT CHANGE UP (ref 1.6–2.6)
MCHC RBC-ENTMCNC: 32.9 PG — SIGNIFICANT CHANGE UP (ref 27–34)
MCHC RBC-ENTMCNC: 33.2 PG — SIGNIFICANT CHANGE UP (ref 27–34)
MCHC RBC-ENTMCNC: 34.3 GM/DL — SIGNIFICANT CHANGE UP (ref 32–36)
MCHC RBC-ENTMCNC: 35.1 GM/DL — SIGNIFICANT CHANGE UP (ref 32–36)
MCV RBC AUTO: 94.6 FL — SIGNIFICANT CHANGE UP (ref 80–100)
MCV RBC AUTO: 95.9 FL — SIGNIFICANT CHANGE UP (ref 80–100)
MONOCYTES # BLD AUTO: 0.95 K/UL — HIGH (ref 0–0.9)
MONOCYTES NFR BLD AUTO: 7.6 % — SIGNIFICANT CHANGE UP (ref 2–14)
NEUTROPHILS # BLD AUTO: 10.65 K/UL — HIGH (ref 1.8–7.4)
NEUTROPHILS NFR BLD AUTO: 85.3 % — HIGH (ref 43–77)
NITRITE UR-MCNC: NEGATIVE — SIGNIFICANT CHANGE UP
NRBC # BLD: 0 /100 WBCS — SIGNIFICANT CHANGE UP (ref 0–0)
NRBC # BLD: 0 /100 WBCS — SIGNIFICANT CHANGE UP (ref 0–0)
NT-PROBNP SERPL-SCNC: 43 PG/ML — SIGNIFICANT CHANGE UP (ref 0–300)
PH UR: 6 — SIGNIFICANT CHANGE UP (ref 5–8)
PHOSPHATE SERPL-MCNC: 2.6 MG/DL — SIGNIFICANT CHANGE UP (ref 2.5–4.5)
PLATELET # BLD AUTO: 114 K/UL — LOW (ref 150–400)
PLATELET # BLD AUTO: 119 K/UL — LOW (ref 150–400)
POTASSIUM SERPL-MCNC: 4 MMOL/L — SIGNIFICANT CHANGE UP (ref 3.5–5.3)
POTASSIUM SERPL-MCNC: 4.2 MMOL/L — SIGNIFICANT CHANGE UP (ref 3.5–5.3)
POTASSIUM SERPL-SCNC: 4 MMOL/L — SIGNIFICANT CHANGE UP (ref 3.5–5.3)
POTASSIUM SERPL-SCNC: 4.2 MMOL/L — SIGNIFICANT CHANGE UP (ref 3.5–5.3)
PROT SERPL-MCNC: 6.3 G/DL — SIGNIFICANT CHANGE UP (ref 6–8.3)
PROT UR-MCNC: NEGATIVE MG/DL — SIGNIFICANT CHANGE UP
PROTHROM AB SERPL-ACNC: 12.1 SEC — SIGNIFICANT CHANGE UP (ref 10.6–13.6)
PROTHROM AB SERPL-ACNC: 14.2 SEC — HIGH (ref 10.6–13.6)
RBC # BLD: 4.85 M/UL — SIGNIFICANT CHANGE UP (ref 4.2–5.8)
RBC # BLD: 4.92 M/UL — SIGNIFICANT CHANGE UP (ref 4.2–5.8)
RBC # FLD: 13 % — SIGNIFICANT CHANGE UP (ref 10.3–14.5)
RBC # FLD: 13 % — SIGNIFICANT CHANGE UP (ref 10.3–14.5)
RH IG SCN BLD-IMP: POSITIVE — SIGNIFICANT CHANGE UP
SODIUM SERPL-SCNC: 137 MMOL/L — SIGNIFICANT CHANGE UP (ref 135–145)
SODIUM SERPL-SCNC: 140 MMOL/L — SIGNIFICANT CHANGE UP (ref 135–145)
SP GR SPEC: 1.02 — SIGNIFICANT CHANGE UP (ref 1–1.03)
TSH SERPL-MCNC: 0.3 UIU/ML — LOW (ref 0.35–4.94)
UROBILINOGEN FLD QL: 0.2 E.U./DL — SIGNIFICANT CHANGE UP
WBC # BLD: 12.49 K/UL — HIGH (ref 3.8–10.5)
WBC # BLD: 5.24 K/UL — SIGNIFICANT CHANGE UP (ref 3.8–10.5)
WBC # FLD AUTO: 12.49 K/UL — HIGH (ref 3.8–10.5)
WBC # FLD AUTO: 5.24 K/UL — SIGNIFICANT CHANGE UP (ref 3.8–10.5)

## 2020-09-30 PROCEDURE — 93306 TTE W/DOPPLER COMPLETE: CPT | Mod: 26

## 2020-09-30 PROCEDURE — 99292 CRITICAL CARE ADDL 30 MIN: CPT

## 2020-09-30 PROCEDURE — 93010 ELECTROCARDIOGRAM REPORT: CPT

## 2020-09-30 PROCEDURE — 33533 CABG ARTERIAL SINGLE: CPT

## 2020-09-30 PROCEDURE — 76998 US GUIDE INTRAOP: CPT | Mod: 26,59

## 2020-09-30 PROCEDURE — 99291 CRITICAL CARE FIRST HOUR: CPT

## 2020-09-30 PROCEDURE — 99233 SBSQ HOSP IP/OBS HIGH 50: CPT

## 2020-09-30 PROCEDURE — 71045 X-RAY EXAM CHEST 1 VIEW: CPT | Mod: 26

## 2020-09-30 RX ORDER — DEXTROSE 50 % IN WATER 50 %
25 SYRINGE (ML) INTRAVENOUS
Refills: 0 | Status: DISCONTINUED | OUTPATIENT
Start: 2020-09-30 | End: 2020-10-01

## 2020-09-30 RX ORDER — PANTOPRAZOLE SODIUM 20 MG/1
40 TABLET, DELAYED RELEASE ORAL DAILY
Refills: 0 | Status: DISCONTINUED | OUTPATIENT
Start: 2020-09-30 | End: 2020-10-01

## 2020-09-30 RX ORDER — BUPIVACAINE 13.3 MG/ML
20 INJECTION, SUSPENSION, LIPOSOMAL INFILTRATION ONCE
Refills: 0 | Status: DISCONTINUED | OUTPATIENT
Start: 2020-09-30 | End: 2020-09-30

## 2020-09-30 RX ORDER — HEPARIN SODIUM 5000 [USP'U]/ML
5000 INJECTION INTRAVENOUS; SUBCUTANEOUS EVERY 8 HOURS
Refills: 0 | Status: DISCONTINUED | OUTPATIENT
Start: 2020-09-30 | End: 2020-10-06

## 2020-09-30 RX ORDER — DEXMEDETOMIDINE HYDROCHLORIDE IN 0.9% SODIUM CHLORIDE 4 UG/ML
0.3 INJECTION INTRAVENOUS
Qty: 400 | Refills: 0 | Status: DISCONTINUED | OUTPATIENT
Start: 2020-09-30 | End: 2020-10-01

## 2020-09-30 RX ORDER — SODIUM CHLORIDE 9 MG/ML
1000 INJECTION INTRAMUSCULAR; INTRAVENOUS; SUBCUTANEOUS
Refills: 0 | Status: DISCONTINUED | OUTPATIENT
Start: 2020-09-30 | End: 2020-10-03

## 2020-09-30 RX ORDER — CHLORHEXIDINE GLUCONATE 213 G/1000ML
15 SOLUTION TOPICAL EVERY 12 HOURS
Refills: 0 | Status: DISCONTINUED | OUTPATIENT
Start: 2020-09-30 | End: 2020-10-01

## 2020-09-30 RX ORDER — CLOPIDOGREL BISULFATE 75 MG/1
75 TABLET, FILM COATED ORAL DAILY
Refills: 0 | Status: DISCONTINUED | OUTPATIENT
Start: 2020-09-30 | End: 2020-10-06

## 2020-09-30 RX ORDER — ACETAMINOPHEN 500 MG
1000 TABLET ORAL ONCE
Refills: 0 | Status: COMPLETED | OUTPATIENT
Start: 2020-09-30 | End: 2020-09-30

## 2020-09-30 RX ORDER — INSULIN HUMAN 100 [IU]/ML
2 INJECTION, SOLUTION SUBCUTANEOUS
Qty: 50 | Refills: 0 | Status: DISCONTINUED | OUTPATIENT
Start: 2020-09-30 | End: 2020-10-01

## 2020-09-30 RX ORDER — DEXTROSE 50 % IN WATER 50 %
50 SYRINGE (ML) INTRAVENOUS
Refills: 0 | Status: DISCONTINUED | OUTPATIENT
Start: 2020-09-30 | End: 2020-10-01

## 2020-09-30 RX ORDER — CEFAZOLIN SODIUM 1 G
2000 VIAL (EA) INJECTION EVERY 8 HOURS
Refills: 0 | Status: COMPLETED | OUTPATIENT
Start: 2020-09-30 | End: 2020-10-02

## 2020-09-30 RX ADMIN — CHLORHEXIDINE GLUCONATE 15 MILLILITER(S): 213 SOLUTION TOPICAL at 20:00

## 2020-09-30 RX ADMIN — CHLORHEXIDINE GLUCONATE 1 APPLICATION(S): 213 SOLUTION TOPICAL at 05:33

## 2020-09-30 RX ADMIN — CHLORHEXIDINE GLUCONATE 10 MILLILITER(S): 213 SOLUTION TOPICAL at 08:44

## 2020-09-30 RX ADMIN — Medication 100 MILLIGRAM(S): at 21:54

## 2020-09-30 RX ADMIN — Medication 81 MILLIGRAM(S): at 11:03

## 2020-09-30 RX ADMIN — PANTOPRAZOLE SODIUM 40 MILLIGRAM(S): 20 TABLET, DELAYED RELEASE ORAL at 23:00

## 2020-09-30 RX ADMIN — DEXMEDETOMIDINE HYDROCHLORIDE IN 0.9% SODIUM CHLORIDE 5.38 MICROGRAM(S)/KG/HR: 4 INJECTION INTRAVENOUS at 22:52

## 2020-09-30 RX ADMIN — Medication 400 MILLIGRAM(S): at 22:10

## 2020-09-30 RX ADMIN — Medication 125 MICROGRAM(S): at 07:02

## 2020-09-30 RX ADMIN — HEPARIN SODIUM 5000 UNIT(S): 5000 INJECTION INTRAVENOUS; SUBCUTANEOUS at 21:54

## 2020-09-30 RX ADMIN — INSULIN HUMAN 2 UNIT(S)/HR: 100 INJECTION, SOLUTION SUBCUTANEOUS at 23:30

## 2020-09-30 RX ADMIN — Medication 12.5 MILLIGRAM(S): at 06:45

## 2020-09-30 RX ADMIN — PANTOPRAZOLE SODIUM 40 MILLIGRAM(S): 20 TABLET, DELAYED RELEASE ORAL at 06:48

## 2020-09-30 NOTE — PROGRESS NOTE ADULT - SUBJECTIVE AND OBJECTIVE BOX
S: Pt seen and examined bedside. Pt reports he slept well last night and is without complaints. Pt is aware of planned surgery with Dr. Clark today 9/30/20.   Patient denies C/P, SOB, N/V, dizziness, palpitations, and diaphoresis.  Pt denies fever/chills, dysuria, abdominal pain, diarrhea, and cough  12 Point ROS otherwise negative except as per HPI/subjective.     O: Vital Signs Last 24 Hrs  T(C): 36.2 (30 Sep 2020 08:50), Max: 36.9 (30 Sep 2020 06:39)  T(F): 97.1 (30 Sep 2020 08:50), Max: 98.5 (30 Sep 2020 06:39)  HR: 68 (30 Sep 2020 08:36) (68 - 97)  BP: 120/71 (30 Sep 2020 08:36) (111/58 - 145/79)  BP(mean): --  RR: 18 (30 Sep 2020 08:36) (18 - 18)  SpO2: 94% (30 Sep 2020 08:36) (94% - 97%)    PHYSICAL EXAM:  GEN: NAD  HEENT: No JVD  PULM:  CTA B/L, no WRR  CARD:  RRR, S1 and S2   ABD: +BS, NT, soft/ND	  EXT: No Edema B/L LE  NEURO: A+Ox3, no focal deficit  PSYCH: Mood Appropriate    LABS:                        16.2   5.24  )-----------( 114      ( 30 Sep 2020 06:45 )             47.2     09-30    140  |  104  |  22  ----------------------------<  116<H>  4.2   |  28  |  1.03    Ca    8.9      30 Sep 2020 06:45  Mg     2.2     09-30    TPro  7.6  /  Alb  4.3  /  TBili  0.6  /  DBili  x   /  AST  24  /  ALT  30  /  AlkPhos  67  09-29    PT/INR - ( 30 Sep 2020 06:45 )   PT: 12.1 sec;   INR: 1.01          PTT - ( 30 Sep 2020 06:45 )  PTT:27.3 sec      Daily Height in cm: 152.4 (30 Sep 2020 07:48)    Daily

## 2020-09-30 NOTE — PROGRESS NOTE ADULT - ASSESSMENT
CAD s/p MID CA for hybrid vascularization  N EF  lactic acidosis/mild hypovolemia  elevated RT hemidiaphragm    Given albumin  wean sedation to assess MS and extubate  will need ASA and plavix    cc time 45 min      CAD s/p MID CAB for hybrid vascularization for 3VD  N EF  lactic acidosis/mild hypovolemia  elevated RT hemidiaphragm    Given albumin  wean sedation to assess MS and extubate  will need ASA and plavix  will need statin    cc time 45 min

## 2020-09-30 NOTE — PROGRESS NOTE ADULT - ASSESSMENT
69 yo M (shellfish allergy) with PMHx of HTN, HLD, thyroid cancer s/p thyroidectomy with XRT on 2018 with subsequent hypothyroidism (now on synthroid), h/o DVT R leg after flight to [facility] 2/2020 (on Xarelto with plan to complete 9 month course, last dose Thursday 9/24/2020; pt reports having a negative duplex US about 3wks ago), s/p cancerous cyst removal from back under general anesthesia in 2016 who presented to Cardiologist Dr. Ryan Coughlin with c/o CP/HDZ on exertion. NST 8/27/20: small area of anterior wall ischemia, LVEF 62%. ECHO per MD note: LVEF 57%, LVH, mild MR. S/p cardiac catheterization 9/29/30 revealing 3VCAD with recommended Midcab with Dr. Clark today 9/30/20.

## 2020-09-30 NOTE — PROGRESS NOTE ADULT - SUBJECTIVE AND OBJECTIVE BOX
CTICU  CRITICAL  CARE  attending     Hand off received 					   Pertinent clinical, laboratory, radiographic, hemodynamic, echocardiographic, respiratory data, microbiologic data and chart were reviewed and analyzed frequently throughout the course of the day and night  Patient seen and examined with CTS/ SH attending at bedside  Pt is a 70y , Male, HEALTH ISSUES - PROBLEM Dx:  Hypothyroid  Hypothyroid    Hyperlipidemia  Hyperlipidemia    Hypertension  Hypertension    CAD (coronary artery disease)  CAD (coronary artery disease)    Suspected deep vein thrombosis (DVT)        , FAMILY HISTORY:  No pertinent family history in first degree relatives    PAST MEDICAL & SURGICAL HISTORY:  Hypothyroid    DVT (deep venous thrombosis)  R, dx 2/2020    Hyperlipidemia    Hypertension    History of skin surgery  s/p excision of cancerous lesion on back under  general anesthesia in 2016    S/P thyroid surgery  2018      Patient is a 70y old  Male who presents with a chief complaint of cardiac catheterization with possible intervention (30 Sep 2020 10:56)      14 system review was unremarkable    Vital signs, hemodynamic and respiratory parameters were reviewed from the bedside nursing flowsheet.  ICU Vital Signs Last 24 Hrs  T(C): 36.1 (30 Sep 2020 17:50), Max: 36.9 (30 Sep 2020 06:39)  T(F): 97 (30 Sep 2020 17:50), Max: 98.5 (30 Sep 2020 06:39)  HR: 83 (30 Sep 2020 18:30) (68 - 83)  BP: 142/81 (30 Sep 2020 11:03) (111/58 - 145/79)  BP(mean): --  ABP: 139/70 (30 Sep 2020 18:30) (114/55 - 139/70)  ABP(mean): 99 (30 Sep 2020 18:30) (80 - 99)  RR: 14 (30 Sep 2020 18:30) (14 - 20)  SpO2: 100% (30 Sep 2020 18:30) (94% - 100%)    Adult Advanced Hemodynamics Last 24 Hrs  CVP(mm Hg): 8 (30 Sep 2020 18:30) (4 - 12)  CVP(cm H2O): --  CO: --  CI: --  PA: --  PA(mean): --  PCWP: --  SVR: --  SVRI: --  PVR: --  PVRI: --, ABG - ( 30 Sep 2020 18:27 )  pH, Arterial: 7.38  pH, Blood: x     /  pCO2: 35    /  pO2: 115   / HCO3: 20    / Base Excess: -4.2  /  SaO2: 98                Mode: AC/ CMV (Assist Control/ Continuous Mandatory Ventilation)  RR (machine): 14  TV (machine): 550  FiO2: 50  PEEP: 5  ITime: 1  MAP: 9  PIP: 23    Intake and output was reviewed and the fluid balance was calculated  Daily Height in cm: 152.4 (30 Sep 2020 07:48)    Daily   I&O's Summary    30 Sep 2020 07:01  -  30 Sep 2020 18:51  --------------------------------------------------------  IN: 0 mL / OUT: 60 mL / NET: -60 mL        All lines and drain sites were assessed  Glycemic trend was reviewedCAPILLARY BLOOD GLUCOSE        No acute change in mental status  Auscultation of the chest reveals equal bs  Abdomen is soft  Extremities are warm and well perfused  Wounds appear clean and unremarkable  Antibiotics are periop    labs  CBC Full  -  ( 30 Sep 2020 18:03 )  WBC Count : 12.49 K/uL  RBC Count : 4.85 M/uL  Hemoglobin : 16.1 g/dL  Hematocrit : 45.9 %  Platelet Count - Automated : 119 K/uL  Mean Cell Volume : 94.6 fl  Mean Cell Hemoglobin : 33.2 pg  Mean Cell Hemoglobin Concentration : 35.1 gm/dL  Auto Neutrophil # : 10.65 K/uL  Auto Lymphocyte # : 0.76 K/uL  Auto Monocyte # : 0.95 K/uL  Auto Eosinophil # : 0.01 K/uL  Auto Basophil # : 0.04 K/uL  Auto Neutrophil % : 85.3 %  Auto Lymphocyte % : 6.1 %  Auto Monocyte % : 7.6 %  Auto Eosinophil % : 0.1 %  Auto Basophil % : 0.3 %    09-30    140  |  104  |  22  ----------------------------<  116<H>  4.2   |  28  |  1.03    Ca    8.9      30 Sep 2020 06:45  Mg     2.2     09-30    TPro  7.6  /  Alb  4.3  /  TBili  0.6  /  DBili  x   /  AST  24  /  ALT  30  /  AlkPhos  67  09-29    PT/INR - ( 30 Sep 2020 18:03 )   PT: 14.2 sec;   INR: 1.19          PTT - ( 30 Sep 2020 18:03 )  PTT:26.6 sec  The current medications were reviewed   MEDICATIONS  (STANDING):  aspirin enteric coated 81 milliGRAM(s) Oral daily  ceFAZolin   IVPB 2000 milliGRAM(s) IV Intermittent every 8 hours  chlorhexidine 0.12% Liquid 15 milliLiter(s) Oral Mucosa every 12 hours  clopidogrel Tablet 75 milliGRAM(s) Oral daily  dextrose 50% Injectable 50 milliLiter(s) IV Push every 15 minutes  dextrose 50% Injectable 25 milliLiter(s) IV Push every 15 minutes  heparin   Injectable 5000 Unit(s) SubCutaneous every 8 hours  insulin regular Infusion 2 Unit(s)/Hr (2 mL/Hr) IV Continuous <Continuous>  levothyroxine 125 MICROGram(s) Oral daily  metoprolol tartrate 12.5 milliGRAM(s) Oral every 12 hours  pantoprazole  Injectable 40 milliGRAM(s) IV Push daily  sodium chloride 0.9%. 1000 milliLiter(s) (10 mL/Hr) IV Continuous <Continuous>    MEDICATIONS  (PRN):       PROBLEM LIST/ ASSESSMENT:  HEALTH ISSUES - PROBLEM Dx:  Hypothyroid  Hypothyroid    Hyperlipidemia  Hyperlipidemia    Hypertension  Hypertension    CAD (coronary artery disease)  CAD (coronary artery disease)    Suspected deep vein thrombosis (DVT)        ,   Patient is a 70y old  Male who presents with a chief complaint of cardiac catheterization with possible intervention (30 Sep 2020 10:56)     s/p cardiac surgery                My plan includes :  close hemodynamic, ventilatory and drain monitoring and management per post op routine    Monitor for arrhythmias and monitor parameters for organ perfusion  beta blockade not administered due to hemodynamic instability and bradycardia  monitor neurologic status  Head of the bed should remain elevated to 45 deg .   chest PT and IS will be encouraged  monitor adequacy of oxygenation and ventilation and attempt to wean oxygen  antibiotic regimen will be tailored to the clinical, laboratory and microbiologic data  Nutritional goals will be met using po eventually , ensure adequate caloric intake and montior the same  Stress ulcer and VTE prophylaxis will be achieved    Glycemic control is satisfactory  Electrolytes have been repleted as necessary and wound care has been carried out. Pain control has been achieved.   agressive physical therapy and early mobility and ambulation goals will be met   The family was updated about the course and plan  CRITICAL CARE TIME personally provided by me  in evaluation and management, reassessments, review and interpretation of labs and x-rays, ventilator and hemodynamic management, formulating a plan and coordinating care: ___90____ MIN.  Time does not include procedural time. Time spent was non routine post-operarive caRE and included multiple and repeated evaluations at the bedside  CTICU ATTENDING     					    Thomas Corea MD

## 2020-09-30 NOTE — PROGRESS NOTE ADULT - ATTENDING COMMENTS
See PA note written above, for details. I reviewed the PA documentation.  I have personally seen and examined this patient today. I reviewed vitals, labs, medications, cardiac studies and additional imaging.  I agree with the PA's findings and plans as written above with the following additions/amendments:  Patient prepped for MIDCAB with Dr. Clark today  NPO for OR  Patient to transfer to 9E post op  CRISTIAN Montoya.  Cardiology Attending

## 2020-09-30 NOTE — PROGRESS NOTE ADULT - SUBJECTIVE AND OBJECTIVE BOX
s/p MID CAB  Patient arrived from OR orally intubated on low dose levophed  no significant bleeding from chest tubes

## 2020-09-30 NOTE — BRIEF OPERATIVE NOTE - NSICDXBRIEFPROCEDURE_GEN_ALL_CORE_FT
PROCEDURES:  Minimally invasive direct coronary artery bypass (MIDCAB) with transesophageal echocardiography (DERRICK) 30-Sep-2020 16:55:01 Robotic assisted MIDCAB (SAEED-LAD) Adryan Adan

## 2020-09-30 NOTE — BRIEF OPERATIVE NOTE - COMMENTS
EF: 60%    I first assisted for the entirety of the case, including but not limited to robotic instrumentation,  distal anastomoses, and closure. EF: 60%    To ICU intubated, on 0.08 of levophed    I first assisted for the entirety of the case, including but not limited to robotic instrumentation,  distal anastomoses, and closure.

## 2020-09-30 NOTE — PROGRESS NOTE ADULT - PROBLEM SELECTOR PLAN 1
S/p cardiac cath 9/29/20 revealing significant 3VCAD, EF normal, EDP normal, Failed R radial access, R groin PC.   - CTS Dr. Clark consulted for Midcab, planned for 9/30/20 with hybrid procedure to follow   - Preop testing completed  - Pt was loaded with Asprin 325mg and Plavix 600mg 9/29/20 prior to cath and continued on Aspirin 81mg PO QD.   - Atorvastatin increased to 40mg PO QD   - NPO

## 2020-10-01 LAB
ALBUMIN SERPL ELPH-MCNC: 3.4 G/DL — SIGNIFICANT CHANGE UP (ref 3.3–5)
ALBUMIN SERPL ELPH-MCNC: 3.7 G/DL — SIGNIFICANT CHANGE UP (ref 3.3–5)
ALP SERPL-CCNC: 47 U/L — SIGNIFICANT CHANGE UP (ref 40–120)
ALP SERPL-CCNC: 54 U/L — SIGNIFICANT CHANGE UP (ref 40–120)
ALT FLD-CCNC: 27 U/L — SIGNIFICANT CHANGE UP (ref 10–45)
ALT FLD-CCNC: 31 U/L — SIGNIFICANT CHANGE UP (ref 10–45)
ANION GAP SERPL CALC-SCNC: 12 MMOL/L — SIGNIFICANT CHANGE UP (ref 5–17)
ANION GAP SERPL CALC-SCNC: 13 MMOL/L — SIGNIFICANT CHANGE UP (ref 5–17)
APTT BLD: 26.5 SEC — LOW (ref 27.5–35.5)
APTT BLD: 41.7 SEC — HIGH (ref 27.5–35.5)
AST SERPL-CCNC: 31 U/L — SIGNIFICANT CHANGE UP (ref 10–40)
AST SERPL-CCNC: 32 U/L — SIGNIFICANT CHANGE UP (ref 10–40)
BILIRUB SERPL-MCNC: 0.4 MG/DL — SIGNIFICANT CHANGE UP (ref 0.2–1.2)
BILIRUB SERPL-MCNC: 0.6 MG/DL — SIGNIFICANT CHANGE UP (ref 0.2–1.2)
BUN SERPL-MCNC: 15 MG/DL — SIGNIFICANT CHANGE UP (ref 7–23)
BUN SERPL-MCNC: 16 MG/DL — SIGNIFICANT CHANGE UP (ref 7–23)
CALCIUM SERPL-MCNC: 7.9 MG/DL — LOW (ref 8.4–10.5)
CALCIUM SERPL-MCNC: 9 MG/DL — SIGNIFICANT CHANGE UP (ref 8.4–10.5)
CHLORIDE SERPL-SCNC: 100 MMOL/L — SIGNIFICANT CHANGE UP (ref 96–108)
CHLORIDE SERPL-SCNC: 99 MMOL/L — SIGNIFICANT CHANGE UP (ref 96–108)
CO2 SERPL-SCNC: 22 MMOL/L — SIGNIFICANT CHANGE UP (ref 22–31)
CO2 SERPL-SCNC: 23 MMOL/L — SIGNIFICANT CHANGE UP (ref 22–31)
CREAT SERPL-MCNC: 0.74 MG/DL — SIGNIFICANT CHANGE UP (ref 0.5–1.3)
CREAT SERPL-MCNC: 0.82 MG/DL — SIGNIFICANT CHANGE UP (ref 0.5–1.3)
GAS PNL BLDA: SIGNIFICANT CHANGE UP
GLUCOSE BLDC GLUCOMTR-MCNC: 101 MG/DL — HIGH (ref 70–99)
GLUCOSE BLDC GLUCOMTR-MCNC: 108 MG/DL — HIGH (ref 70–99)
GLUCOSE BLDC GLUCOMTR-MCNC: 116 MG/DL — HIGH (ref 70–99)
GLUCOSE BLDC GLUCOMTR-MCNC: 125 MG/DL — HIGH (ref 70–99)
GLUCOSE BLDC GLUCOMTR-MCNC: 148 MG/DL — HIGH (ref 70–99)
GLUCOSE BLDC GLUCOMTR-MCNC: 161 MG/DL — HIGH (ref 70–99)
GLUCOSE SERPL-MCNC: 121 MG/DL — HIGH (ref 70–99)
GLUCOSE SERPL-MCNC: 184 MG/DL — HIGH (ref 70–99)
HCT VFR BLD CALC: 42.6 % — SIGNIFICANT CHANGE UP (ref 39–50)
HCT VFR BLD CALC: 44.1 % — SIGNIFICANT CHANGE UP (ref 39–50)
HGB BLD-MCNC: 14.8 G/DL — SIGNIFICANT CHANGE UP (ref 13–17)
HGB BLD-MCNC: 15.6 G/DL — SIGNIFICANT CHANGE UP (ref 13–17)
INR BLD: 1.1 — SIGNIFICANT CHANGE UP (ref 0.88–1.16)
INR BLD: 1.22 — HIGH (ref 0.88–1.16)
LACTATE SERPL-SCNC: 1.9 MMOL/L — SIGNIFICANT CHANGE UP (ref 0.5–2)
MAGNESIUM SERPL-MCNC: 1.6 MG/DL — SIGNIFICANT CHANGE UP (ref 1.6–2.6)
MAGNESIUM SERPL-MCNC: 1.7 MG/DL — SIGNIFICANT CHANGE UP (ref 1.6–2.6)
MCHC RBC-ENTMCNC: 32.9 PG — SIGNIFICANT CHANGE UP (ref 27–34)
MCHC RBC-ENTMCNC: 33.2 PG — SIGNIFICANT CHANGE UP (ref 27–34)
MCHC RBC-ENTMCNC: 34.7 GM/DL — SIGNIFICANT CHANGE UP (ref 32–36)
MCHC RBC-ENTMCNC: 35.4 GM/DL — SIGNIFICANT CHANGE UP (ref 32–36)
MCV RBC AUTO: 93.8 FL — SIGNIFICANT CHANGE UP (ref 80–100)
MCV RBC AUTO: 94.7 FL — SIGNIFICANT CHANGE UP (ref 80–100)
NRBC # BLD: 0 /100 WBCS — SIGNIFICANT CHANGE UP (ref 0–0)
NRBC # BLD: 0 /100 WBCS — SIGNIFICANT CHANGE UP (ref 0–0)
PHOSPHATE SERPL-MCNC: 2.5 MG/DL — SIGNIFICANT CHANGE UP (ref 2.5–4.5)
PHOSPHATE SERPL-MCNC: 2.8 MG/DL — SIGNIFICANT CHANGE UP (ref 2.5–4.5)
PLATELET # BLD AUTO: 103 K/UL — LOW (ref 150–400)
PLATELET # BLD AUTO: 109 K/UL — LOW (ref 150–400)
POTASSIUM SERPL-MCNC: 3.6 MMOL/L — SIGNIFICANT CHANGE UP (ref 3.5–5.3)
POTASSIUM SERPL-MCNC: 4.5 MMOL/L — SIGNIFICANT CHANGE UP (ref 3.5–5.3)
POTASSIUM SERPL-SCNC: 3.6 MMOL/L — SIGNIFICANT CHANGE UP (ref 3.5–5.3)
POTASSIUM SERPL-SCNC: 4.5 MMOL/L — SIGNIFICANT CHANGE UP (ref 3.5–5.3)
PROT SERPL-MCNC: 5.9 G/DL — LOW (ref 6–8.3)
PROT SERPL-MCNC: 6.3 G/DL — SIGNIFICANT CHANGE UP (ref 6–8.3)
PROTHROM AB SERPL-ACNC: 13.1 SEC — SIGNIFICANT CHANGE UP (ref 10.6–13.6)
PROTHROM AB SERPL-ACNC: 14.5 SEC — HIGH (ref 10.6–13.6)
RBC # BLD: 4.5 M/UL — SIGNIFICANT CHANGE UP (ref 4.2–5.8)
RBC # BLD: 4.7 M/UL — SIGNIFICANT CHANGE UP (ref 4.2–5.8)
RBC # FLD: 12.8 % — SIGNIFICANT CHANGE UP (ref 10.3–14.5)
RBC # FLD: 13.1 % — SIGNIFICANT CHANGE UP (ref 10.3–14.5)
SODIUM SERPL-SCNC: 134 MMOL/L — LOW (ref 135–145)
SODIUM SERPL-SCNC: 135 MMOL/L — SIGNIFICANT CHANGE UP (ref 135–145)
WBC # BLD: 10.26 K/UL — SIGNIFICANT CHANGE UP (ref 3.8–10.5)
WBC # BLD: 9.67 K/UL — SIGNIFICANT CHANGE UP (ref 3.8–10.5)
WBC # FLD AUTO: 10.26 K/UL — SIGNIFICANT CHANGE UP (ref 3.8–10.5)
WBC # FLD AUTO: 9.67 K/UL — SIGNIFICANT CHANGE UP (ref 3.8–10.5)

## 2020-10-01 PROCEDURE — 99291 CRITICAL CARE FIRST HOUR: CPT

## 2020-10-01 PROCEDURE — 99292 CRITICAL CARE ADDL 30 MIN: CPT

## 2020-10-01 PROCEDURE — 71045 X-RAY EXAM CHEST 1 VIEW: CPT | Mod: 26,76

## 2020-10-01 RX ORDER — PANTOPRAZOLE SODIUM 20 MG/1
40 TABLET, DELAYED RELEASE ORAL
Refills: 0 | Status: DISCONTINUED | OUTPATIENT
Start: 2020-10-01 | End: 2020-10-06

## 2020-10-01 RX ORDER — SODIUM CHLORIDE 9 MG/ML
1000 INJECTION, SOLUTION INTRAVENOUS ONCE
Refills: 0 | Status: COMPLETED | OUTPATIENT
Start: 2020-10-01 | End: 2020-10-01

## 2020-10-01 RX ORDER — OXYCODONE AND ACETAMINOPHEN 5; 325 MG/1; MG/1
1 TABLET ORAL EVERY 6 HOURS
Refills: 0 | Status: DISCONTINUED | OUTPATIENT
Start: 2020-10-01 | End: 2020-10-06

## 2020-10-01 RX ORDER — POTASSIUM CHLORIDE 20 MEQ
20 PACKET (EA) ORAL
Refills: 0 | Status: COMPLETED | OUTPATIENT
Start: 2020-10-01 | End: 2020-10-01

## 2020-10-01 RX ORDER — CALCIUM GLUCONATE 100 MG/ML
2 VIAL (ML) INTRAVENOUS ONCE
Refills: 0 | Status: COMPLETED | OUTPATIENT
Start: 2020-10-01 | End: 2020-10-01

## 2020-10-01 RX ORDER — POLYETHYLENE GLYCOL 3350 17 G/17G
17 POWDER, FOR SOLUTION ORAL DAILY
Refills: 0 | Status: DISCONTINUED | OUTPATIENT
Start: 2020-10-01 | End: 2020-10-06

## 2020-10-01 RX ORDER — HYDROMORPHONE HYDROCHLORIDE 2 MG/ML
0.5 INJECTION INTRAMUSCULAR; INTRAVENOUS; SUBCUTANEOUS
Refills: 0 | Status: DISCONTINUED | OUTPATIENT
Start: 2020-10-01 | End: 2020-10-01

## 2020-10-01 RX ORDER — FUROSEMIDE 40 MG
20 TABLET ORAL ONCE
Refills: 0 | Status: COMPLETED | OUTPATIENT
Start: 2020-10-01 | End: 2020-10-01

## 2020-10-01 RX ORDER — DEXTROSE 50 % IN WATER 50 %
12.5 SYRINGE (ML) INTRAVENOUS ONCE
Refills: 0 | Status: DISCONTINUED | OUTPATIENT
Start: 2020-10-01 | End: 2020-10-03

## 2020-10-01 RX ORDER — DEXTROSE 50 % IN WATER 50 %
25 SYRINGE (ML) INTRAVENOUS ONCE
Refills: 0 | Status: DISCONTINUED | OUTPATIENT
Start: 2020-10-01 | End: 2020-10-03

## 2020-10-01 RX ORDER — KETOROLAC TROMETHAMINE 30 MG/ML
15 SYRINGE (ML) INJECTION EVERY 6 HOURS
Refills: 0 | Status: DISCONTINUED | OUTPATIENT
Start: 2020-10-01 | End: 2020-10-05

## 2020-10-01 RX ORDER — ACETAMINOPHEN 500 MG
1000 TABLET ORAL ONCE
Refills: 0 | Status: COMPLETED | OUTPATIENT
Start: 2020-10-01 | End: 2020-10-01

## 2020-10-01 RX ORDER — SENNA PLUS 8.6 MG/1
2 TABLET ORAL AT BEDTIME
Refills: 0 | Status: DISCONTINUED | OUTPATIENT
Start: 2020-10-01 | End: 2020-10-06

## 2020-10-01 RX ORDER — HYDROMORPHONE HYDROCHLORIDE 2 MG/ML
0.5 INJECTION INTRAMUSCULAR; INTRAVENOUS; SUBCUTANEOUS ONCE
Refills: 0 | Status: DISCONTINUED | OUTPATIENT
Start: 2020-10-01 | End: 2020-10-01

## 2020-10-01 RX ORDER — ATORVASTATIN CALCIUM 80 MG/1
40 TABLET, FILM COATED ORAL AT BEDTIME
Refills: 0 | Status: DISCONTINUED | OUTPATIENT
Start: 2020-10-01 | End: 2020-10-06

## 2020-10-01 RX ADMIN — HEPARIN SODIUM 5000 UNIT(S): 5000 INJECTION INTRAVENOUS; SUBCUTANEOUS at 14:28

## 2020-10-01 RX ADMIN — Medication 15 MILLIGRAM(S): at 13:21

## 2020-10-01 RX ADMIN — Medication 20 MILLIGRAM(S): at 20:19

## 2020-10-01 RX ADMIN — SODIUM CHLORIDE 1000 MILLILITER(S): 9 INJECTION, SOLUTION INTRAVENOUS at 09:31

## 2020-10-01 RX ADMIN — HYDROMORPHONE HYDROCHLORIDE 0.5 MILLIGRAM(S): 2 INJECTION INTRAMUSCULAR; INTRAVENOUS; SUBCUTANEOUS at 06:36

## 2020-10-01 RX ADMIN — Medication 15 MILLIGRAM(S): at 05:00

## 2020-10-01 RX ADMIN — Medication 15 MILLIGRAM(S): at 21:51

## 2020-10-01 RX ADMIN — Medication 15 MILLIGRAM(S): at 14:00

## 2020-10-01 RX ADMIN — ATORVASTATIN CALCIUM 40 MILLIGRAM(S): 80 TABLET, FILM COATED ORAL at 21:50

## 2020-10-01 RX ADMIN — OXYCODONE AND ACETAMINOPHEN 1 TABLET(S): 5; 325 TABLET ORAL at 17:49

## 2020-10-01 RX ADMIN — Medication 1000 MILLIGRAM(S): at 00:04

## 2020-10-01 RX ADMIN — OXYCODONE AND ACETAMINOPHEN 1 TABLET(S): 5; 325 TABLET ORAL at 18:47

## 2020-10-01 RX ADMIN — CLOPIDOGREL BISULFATE 75 MILLIGRAM(S): 75 TABLET, FILM COATED ORAL at 11:51

## 2020-10-01 RX ADMIN — HEPARIN SODIUM 5000 UNIT(S): 5000 INJECTION INTRAVENOUS; SUBCUTANEOUS at 06:29

## 2020-10-01 RX ADMIN — Medication 100 MILLIGRAM(S): at 06:28

## 2020-10-01 RX ADMIN — Medication 125 MICROGRAM(S): at 06:30

## 2020-10-01 RX ADMIN — Medication 15 MILLIGRAM(S): at 20:55

## 2020-10-01 RX ADMIN — CLOPIDOGREL BISULFATE 75 MILLIGRAM(S): 75 TABLET, FILM COATED ORAL at 01:21

## 2020-10-01 RX ADMIN — PANTOPRAZOLE SODIUM 40 MILLIGRAM(S): 20 TABLET, DELAYED RELEASE ORAL at 06:30

## 2020-10-01 RX ADMIN — Medication 81 MILLIGRAM(S): at 11:51

## 2020-10-01 RX ADMIN — CHLORHEXIDINE GLUCONATE 15 MILLILITER(S): 213 SOLUTION TOPICAL at 06:54

## 2020-10-01 RX ADMIN — HYDROMORPHONE HYDROCHLORIDE 0.5 MILLIGRAM(S): 2 INJECTION INTRAMUSCULAR; INTRAVENOUS; SUBCUTANEOUS at 09:05

## 2020-10-01 RX ADMIN — Medication 400 MILLIGRAM(S): at 09:00

## 2020-10-01 RX ADMIN — SENNA PLUS 2 TABLET(S): 8.6 TABLET ORAL at 21:51

## 2020-10-01 RX ADMIN — Medication 200 GRAM(S): at 03:36

## 2020-10-01 RX ADMIN — Medication 12.5 MILLIGRAM(S): at 17:27

## 2020-10-01 RX ADMIN — Medication 50 MILLIEQUIVALENT(S): at 02:19

## 2020-10-01 RX ADMIN — Medication 100 MILLIGRAM(S): at 21:51

## 2020-10-01 RX ADMIN — HYDROMORPHONE HYDROCHLORIDE 0.5 MILLIGRAM(S): 2 INJECTION INTRAMUSCULAR; INTRAVENOUS; SUBCUTANEOUS at 06:54

## 2020-10-01 RX ADMIN — Medication 50 MILLIEQUIVALENT(S): at 01:00

## 2020-10-01 RX ADMIN — Medication 15 MILLIGRAM(S): at 03:36

## 2020-10-01 RX ADMIN — HYDROMORPHONE HYDROCHLORIDE 0.5 MILLIGRAM(S): 2 INJECTION INTRAMUSCULAR; INTRAVENOUS; SUBCUTANEOUS at 09:25

## 2020-10-01 RX ADMIN — Medication 1000 MILLIGRAM(S): at 09:25

## 2020-10-01 RX ADMIN — Medication 100 MILLIGRAM(S): at 14:28

## 2020-10-01 RX ADMIN — HEPARIN SODIUM 5000 UNIT(S): 5000 INJECTION INTRAVENOUS; SUBCUTANEOUS at 21:50

## 2020-10-01 NOTE — PHYSICAL THERAPY INITIAL EVALUATION ADULT - CRITERIA FOR SKILLED THERAPEUTIC INTERVENTIONS
impairments found/therapy frequency/anticipated discharge recommendation/risk reduction/prevention/functional limitations in following categories/rehab potential/predicted duration of therapy intervention

## 2020-10-01 NOTE — PHYSICAL THERAPY INITIAL EVALUATION ADULT - ADDITIONAL COMMENTS
Pt lives in apartment with 6 ROSA and 1 flight. Pt lives with wife and children. Prior to sx indpt with ADLs/iADLs, no ambulation limitation. Pt works in airport security on feet. No falls, no device use.

## 2020-10-01 NOTE — PHYSICAL THERAPY INITIAL EVALUATION ADULT - GENERAL OBSERVATIONS, REHAB EVAL
Spoke to RN Sandra, pt cleared for PT. POD#1 MIDCAB, extubated 9/30. Pt rcvd OOB to chair, +tele, +2L NC, +L Elva, +donnell to suction, +CT to suction, +central line, +seaman. Pt agreeable to PT, reports 8/10 pain L>R, given Dilaudid by RN. Tolerated session fairly well, initial dizziness.  Demo Phoebe transfers, amb 150ft x2 CG. Satting 80-93% 2-4L NC. FIM gait=4.

## 2020-10-01 NOTE — PROGRESS NOTE ADULT - SUBJECTIVE AND OBJECTIVE BOX
Patient discussed on morning rounds with Dr. Clark    Operation / Date: Midcab     SUBJECTIVE ASSESSMENT:  70y Male seen and examined this AM. Patient OOB to chair, seated comfortably. Patient states that the pain is still moderate but otherwise feels well. Patient denies dizziness palpitations, substernal chest pain, nausea, vomiting swelling to extremities or calf tenderness.       HPI: 69 yo M with PMHx of HTN, HLD, thyroid cancer s/p thyroidectomy with XRT on  with subsequent hypothyroidism (now on synthroid), h/o DVT R leg after flight to [facility] 2020 (on Xarelto with plan to complete 9 month course, last dose 2020; pt reports having a negative duplex US about 3wks ago. Patient states presented to hospital today for routine stress test, denies history of syncope, chest pain, palpitations, dyspnea, HDZ, Orthopnea, or changes to ADL or daily routines. Patient found to have abnormal stress test, presented for cardiac catheterization. Patient found to have triple vessel disease- Dr. Clark consulted for possible hybrid procedure.       Vital Signs Last 24 Hrs  T(C): 36.3 (01 Oct 2020 05:01), Max: 36.7 (30 Sep 2020 22:45)  T(F): 97.4 (01 Oct 2020 05:01), Max: 98.1 (30 Sep 2020 22:45)  HR: 95 (01 Oct 2020 09:21) (72 - 95)  BP: 124/64 (01 Oct 2020 09:21) (98/61 - 142/81)  BP(mean): 88 (01 Oct 2020 09:21) (74 - 91)  RR: 17 (01 Oct 2020 09:20) (14 - 20)  SpO2: 95% (01 Oct 2020 09:21) (94% - 100%)  I&O's Detail    30 Sep 2020 07:01  -  01 Oct 2020 07:00  --------------------------------------------------------  IN:    Albumin 5%  - 250 mL: 250 mL    Dexmedetomidine: 9 mL    Insulin: 12.5 mL    IV PiggyBack: 500 mL    Lactated Ringers Bolus: 430 mL  Total IN: 1201.5 mL    OUT:    Bulb (mL): 200 mL    Chest Tube (mL): 145 mL    Indwelling Catheter - Urethral (mL): 1810 mL    Nasogastric/Oral tube (mL): 0 mL  Total OUT: 2155 mL    Total NET: -953.5 mL      01 Oct 2020 07:01  -  01 Oct 2020 09:54  --------------------------------------------------------  IN:    IV PiggyBack: 100 mL    Lactated Ringers Bolus: 1000 mL  Total IN: 1100 mL    OUT:    Indwelling Catheter - Urethral (mL): 50 mL  Total OUT: 50 mL    Total NET: 1050 mL          CHEST TUBE: No.   DONNELL DRAIN:  Yes  EPICARDIAL WIRES: No.  TIE DOWNS: Yes  BRICE:No.    PHYSICAL EXAM:  Neuro: A+O x 3, non-focal, speech clear and intact  HEENT: PERRL, EOMI, oral mucosa pink and moist  Neck: supple, no JVD  CV: regular rate, regular rhythm, +S1S2, no murmurs or rub, left thora incision clean, dry, open to air without erythema. CT dressed, donnell in place.   Pulm/chest: lung sounds CTA and equal bilaterally, no accessory muscle use noted  Abd: soft, NT, ND, +BS  Ext: PALACIOS x 4, no C/C/E  Skin: warm, well perfused, no rashes     LABS:                        14.8   10.26 )-----------( 109      ( 01 Oct 2020 04:18 )             42.6       COUMADIN:  Yes/No. REASON: .    PT/INR - ( 01 Oct 2020 04:18 )   PT: 14.5 sec;   INR: 1.22          PTT - ( 01 Oct 2020 04:18 )  PTT:26.5 sec    10-    134<L>  |  99  |  16  ----------------------------<  121<H>  4.5   |  23  |  0.82    Ca    9.0      01 Oct 2020 04:18  Phos  2.5     10-  Mg     1.7     10-01    TPro  5.9<L>  /  Alb  3.4  /  TBili  0.4  /  DBili  x   /  AST  31  /  ALT  27  /  AlkPhos  47  10-      Urinalysis Basic - ( 30 Sep 2020 10:46 )    Color: Yellow / Appearance: Clear / S.020 / pH: x  Gluc: x / Ketone: NEGATIVE  / Bili: Negative / Urobili: 0.2 E.U./dL   Blood: x / Protein: NEGATIVE mg/dL / Nitrite: NEGATIVE   Leuk Esterase: NEGATIVE / RBC: x / WBC x   Sq Epi: x / Non Sq Epi: x / Bacteria: x        MEDICATIONS  (STANDING):  aspirin enteric coated 81 milliGRAM(s) Oral daily  ceFAZolin   IVPB 2000 milliGRAM(s) IV Intermittent every 8 hours  clopidogrel Tablet 75 milliGRAM(s) Oral daily  dextrose 50% Injectable 12.5 Gram(s) IV Push once  dextrose 50% Injectable 25 Gram(s) IV Push once  dextrose 50% Injectable 25 Gram(s) IV Push once  heparin   Injectable 5000 Unit(s) SubCutaneous every 8 hours  levothyroxine 125 MICROGram(s) Oral daily  metoprolol tartrate 12.5 milliGRAM(s) Oral every 12 hours  pantoprazole    Tablet 40 milliGRAM(s) Oral before breakfast  polyethylene glycol 3350 17 Gram(s) Oral daily  senna 2 Tablet(s) Oral at bedtime  sodium chloride 0.9%. 1000 milliLiter(s) (10 mL/Hr) IV Continuous <Continuous>    MEDICATIONS  (PRN):  ketorolac   Injectable 15 milliGRAM(s) IV Push every 6 hours PRN Moderate Pain (4 - 6)        RADIOLOGY & ADDITIONAL TESTS:  EXAM:  XR CHEST PORTABLE URGENT 1V                          PROCEDURE DATE:  2020    INTERPRETATION:  Clinical history/reason for exam: Preop.    Single frontal view.    No comparison.    Findings/  impression: Heart, lungs and mediastinum are unremarkable. Elevation of the right diaphragm. Dextroscoliosis

## 2020-10-01 NOTE — PROGRESS NOTE ADULT - ASSESSMENT
69 y/o male with PMHx of HTN, HLD, thyroid cancer s/p thyroidectomy with XRT on 2018 with subsequent hypothyroidism (now on synthroid), h/o DVT R leg after flight to [facility] 2/2020 (on Xarelto with plan to complete 9 month course, last dose Thursday 9/24/2020; pt reports having a negative duplex US about 3wks ago. Patient states presented to hospital today asymptomatic for routine testing found to have abnormal stress test and triple vessel disease on cardiac cath. Patient now s/p midcab procedure awaiting PCI with Dr. Coughlin     Hospital course  9/30 Midcab SAEED to LAD, extubated  10/1- Downgraded to 9Lach       Neuro   AOx3  -Pain controlled well with current regimen (tordol and tylenol)     Cardiology   -CAD- continue ASA, Plavix for acute graft prophylaxis  -HTN- Continue Lopressor 12.5   -HLD - continue statin for long term graft prophyalxis    Resp  -Oxygenating well on NC  -Wean fio2 as tolerated to maintain spo2 >92%  -encourage IS/ Cough and deep breathing    GI  - protonix for GI prophylaxis  -Senna and miralax for bowel regimen    TAMI  -urszula removed this AM  -DVT @ 5pm    Heme  H/H stable Hemoglobin: 14.8 g/dL (10.01.20 @ 04:18) / Hematocrit: 42.6 % (10.01.20 @ 04:18)    ID  WBC Count: 10.26 K/uL (10.01.20 @ 04:18)   continue ancef for post-operative coverage    Preventative measures   ·	continue SCD while in bed  ·	heparin sq for DVT prophylaxis  ·	Miralalax, senna for bowl regimine  ·	protonix for GI protection      Discussed with Dr. Corea and Dr. Clark

## 2020-10-01 NOTE — PROGRESS NOTE ADULT - SUBJECTIVE AND OBJECTIVE BOX
CTICU  CRITICAL  CARE  attending     Hand off received 					   Pertinent clinical, laboratory, radiographic, hemodynamic, echocardiographic, respiratory data, microbiologic data and chart were reviewed and analyzed frequently throughout the course of the day and night  Patient seen and examined with CTS/ SH attending at bedside  Pt is a 70y , Male, HEALTH ISSUES - PROBLEM Dx:  Hypothyroid  Hypothyroid    Hyperlipidemia  Hyperlipidemia    Hypertension  Hypertension    CAD (coronary artery disease)  CAD (coronary artery disease)    Suspected deep vein thrombosis (DVT)        , FAMILY HISTORY:  No pertinent family history in first degree relatives    PAST MEDICAL & SURGICAL HISTORY:  Hypothyroid    DVT (deep venous thrombosis)  R, dx 2/2020    Hyperlipidemia    Hypertension    History of skin surgery  s/p excision of cancerous lesion on back under  general anesthesia in 2016    S/P thyroid surgery  2018      Patient is a 70y old  Male who presents with a chief complaint of cardiac catheterization with possible intervention (01 Oct 2020 09:54)      14 system review was unremarkable    Vital signs, hemodynamic and respiratory parameters were reviewed from the bedside nursing flowsheet.  ICU Vital Signs Last 24 Hrs  T(C): 36.7 (01 Oct 2020 18:25), Max: 36.8 (01 Oct 2020 13:07)  T(F): 98 (01 Oct 2020 18:25), Max: 98.2 (01 Oct 2020 13:07)  HR: 97 (01 Oct 2020 17:30) (72 - 97)  BP: 154/81 (01 Oct 2020 17:30) (98/61 - 154/81)  BP(mean): 112 (01 Oct 2020 17:30) (72 - 112)  ABP: 126/65 (01 Oct 2020 09:05) (101/57 - 149/77)  ABP(mean): 86 (01 Oct 2020 09:05) (71 - 106)  RR: 24 (01 Oct 2020 17:30) (14 - 24)  SpO2: 92% (01 Oct 2020 17:30) (92% - 100%)    Adult Advanced Hemodynamics Last 24 Hrs  CVP(mm Hg): 14 (01 Oct 2020 09:05) (8 - 16)  CVP(cm H2O): --  CO: --  CI: --  PA: --  PA(mean): --  PCWP: --  SVR: --  SVRI: --  PVR: --  PVRI: --, ABG - ( 01 Oct 2020 04:13 )  pH, Arterial: 7.41  pH, Blood: x     /  pCO2: 38    /  pO2: 85    / HCO3: 23    / Base Excess: -0.9  /  SaO2: 97                  Intake and output was reviewed and the fluid balance was calculated  Daily     Daily   I&O's Summary    30 Sep 2020 07:01  -  01 Oct 2020 07:00  --------------------------------------------------------  IN: 1201.5 mL / OUT: 2155 mL / NET: -953.5 mL    01 Oct 2020 07:01  -  01 Oct 2020 20:21  --------------------------------------------------------  IN: 1270 mL / OUT: 375 mL / NET: 895 mL        All lines and drain sites were assessed  [facility]      POCT Blood Glucose.: 116 mg/dL (01 Oct 2020 07:28)    No acute change in mental status  Auscultation of the chest reveals equal bs  Abdomen is soft  Extremities are warm and well perfused  Wounds appear clean and unremarkable  Antibiotics are periop    labs  CBC Full  -  ( 01 Oct 2020 04:18 )  WBC Count : 10.26 K/uL  RBC Count : 4.50 M/uL  Hemoglobin : 14.8 g/dL  Hematocrit : 42.6 %  Platelet Count - Automated : 109 K/uL  Mean Cell Volume : 94.7 fl  Mean Cell Hemoglobin : 32.9 pg  Mean Cell Hemoglobin Concentration : 34.7 gm/dL  Auto Neutrophil # : x  Auto Lymphocyte # : x  Auto Monocyte # : x  Auto Eosinophil # : x  Auto Basophil # : x  Auto Neutrophil % : x  Auto Lymphocyte % : x  Auto Monocyte % : x  Auto Eosinophil % : x  Auto Basophil % : x    10-01    134<L>  |  99  |  16  ----------------------------<  121<H>  4.5   |  23  |  0.82    Ca    9.0      01 Oct 2020 04:18  Phos  2.5     10-01  Mg     1.7     10-01    TPro  5.9<L>  /  Alb  3.4  /  TBili  0.4  /  DBili  x   /  AST  31  /  ALT  27  /  AlkPhos  47  10-01    PT/INR - ( 01 Oct 2020 04:18 )   PT: 14.5 sec;   INR: 1.22          PTT - ( 01 Oct 2020 04:18 )  PTT:26.5 sec  The current medications were reviewed   MEDICATIONS  (STANDING):  aspirin enteric coated 81 milliGRAM(s) Oral daily  atorvastatin 40 milliGRAM(s) Oral at bedtime  ceFAZolin   IVPB 2000 milliGRAM(s) IV Intermittent every 8 hours  clopidogrel Tablet 75 milliGRAM(s) Oral daily  dextrose 50% Injectable 12.5 Gram(s) IV Push once  dextrose 50% Injectable 25 Gram(s) IV Push once  dextrose 50% Injectable 25 Gram(s) IV Push once  heparin   Injectable 5000 Unit(s) SubCutaneous every 8 hours  levothyroxine 125 MICROGram(s) Oral daily  metoprolol tartrate 12.5 milliGRAM(s) Oral every 12 hours  pantoprazole    Tablet 40 milliGRAM(s) Oral before breakfast  polyethylene glycol 3350 17 Gram(s) Oral daily  senna 2 Tablet(s) Oral at bedtime  sodium chloride 0.9%. 1000 milliLiter(s) (10 mL/Hr) IV Continuous <Continuous>    MEDICATIONS  (PRN):  ketorolac   Injectable 15 milliGRAM(s) IV Push every 6 hours PRN Moderate Pain (4 - 6)  oxycodone    5 mG/acetaminophen 325 mG 1 Tablet(s) Oral every 6 hours PRN Moderate Pain (4 - 6)       PROBLEM LIST/ ASSESSMENT:  HEALTH ISSUES - PROBLEM Dx:  Hypothyroid  Hypothyroid    Hyperlipidemia  Hyperlipidemia    Hypertension  Hypertension    CAD (coronary artery disease)  CAD (coronary artery disease)    Suspected deep vein thrombosis (DVT)        ,   Patient is a 70y old  Male who presents with a chief complaint of cardiac catheterization with possible intervention (01 Oct 2020 09:54)     s/p cardiac surgery                My plan includes :  close hemodynamic, ventilatory and drain monitoring and management per post op routine    Monitor for arrhythmias and monitor parameters for organ perfusion  beta blockade not administered due to hemodynamic instability and bradycardia  monitor neurologic status  Head of the bed should remain elevated to 45 deg .   chest PT and IS will be encouraged  monitor adequacy of oxygenation and ventilation and attempt to wean oxygen  antibiotic regimen will be tailored to the clinical, laboratory and microbiologic data  Nutritional goals will be met using po eventually , ensure adequate caloric intake and montior the same  Stress ulcer and VTE prophylaxis will be achieved    Glycemic control is satisfactory  Electrolytes have been repleted as necessary and wound care has been carried out. Pain control has been achieved.   agressive physical therapy and early mobility and ambulation goals will be met   The family was updated about the course and plan  CRITICAL CARE TIME personally provided by me  in evaluation and management, reassessments, review and interpretation of labs and x-rays, ventilator and hemodynamic management, formulating a plan and coordinating care: ___90____ MIN.  Time does not include procedural time. Time spent was non routine post-operarive caRE and included multiple and repeated evaluations at the bedside  CTICU ATTENDING     					    Thomas Corea MD

## 2020-10-02 LAB
ANION GAP SERPL CALC-SCNC: 11 MMOL/L — SIGNIFICANT CHANGE UP (ref 5–17)
ANION GAP SERPL CALC-SCNC: 9 MMOL/L — SIGNIFICANT CHANGE UP (ref 5–17)
BUN SERPL-MCNC: 26 MG/DL — HIGH (ref 7–23)
BUN SERPL-MCNC: 27 MG/DL — HIGH (ref 7–23)
CALCIUM SERPL-MCNC: 8.6 MG/DL — SIGNIFICANT CHANGE UP (ref 8.4–10.5)
CALCIUM SERPL-MCNC: 8.8 MG/DL — SIGNIFICANT CHANGE UP (ref 8.4–10.5)
CHLORIDE SERPL-SCNC: 100 MMOL/L — SIGNIFICANT CHANGE UP (ref 96–108)
CHLORIDE SERPL-SCNC: 99 MMOL/L — SIGNIFICANT CHANGE UP (ref 96–108)
CO2 SERPL-SCNC: 24 MMOL/L — SIGNIFICANT CHANGE UP (ref 22–31)
CO2 SERPL-SCNC: 28 MMOL/L — SIGNIFICANT CHANGE UP (ref 22–31)
CREAT SERPL-MCNC: 0.97 MG/DL — SIGNIFICANT CHANGE UP (ref 0.5–1.3)
CREAT SERPL-MCNC: 1.1 MG/DL — SIGNIFICANT CHANGE UP (ref 0.5–1.3)
GLUCOSE BLDC GLUCOMTR-MCNC: 138 MG/DL — HIGH (ref 70–99)
GLUCOSE BLDC GLUCOMTR-MCNC: 142 MG/DL — HIGH (ref 70–99)
GLUCOSE BLDC GLUCOMTR-MCNC: 175 MG/DL — HIGH (ref 70–99)
GLUCOSE SERPL-MCNC: 135 MG/DL — HIGH (ref 70–99)
GLUCOSE SERPL-MCNC: 139 MG/DL — HIGH (ref 70–99)
HCT VFR BLD CALC: 42.2 % — SIGNIFICANT CHANGE UP (ref 39–50)
HGB BLD-MCNC: 14.3 G/DL — SIGNIFICANT CHANGE UP (ref 13–17)
MAGNESIUM SERPL-MCNC: 2.1 MG/DL — SIGNIFICANT CHANGE UP (ref 1.6–2.6)
MCHC RBC-ENTMCNC: 33.2 PG — SIGNIFICANT CHANGE UP (ref 27–34)
MCHC RBC-ENTMCNC: 33.9 GM/DL — SIGNIFICANT CHANGE UP (ref 32–36)
MCV RBC AUTO: 97.9 FL — SIGNIFICANT CHANGE UP (ref 80–100)
NRBC # BLD: 0 /100 WBCS — SIGNIFICANT CHANGE UP (ref 0–0)
PHOSPHATE SERPL-MCNC: 2 MG/DL — LOW (ref 2.5–4.5)
PLATELET # BLD AUTO: 99 K/UL — LOW (ref 150–400)
POTASSIUM SERPL-MCNC: 4 MMOL/L — SIGNIFICANT CHANGE UP (ref 3.5–5.3)
POTASSIUM SERPL-MCNC: 4.9 MMOL/L — SIGNIFICANT CHANGE UP (ref 3.5–5.3)
POTASSIUM SERPL-SCNC: 4 MMOL/L — SIGNIFICANT CHANGE UP (ref 3.5–5.3)
POTASSIUM SERPL-SCNC: 4.9 MMOL/L — SIGNIFICANT CHANGE UP (ref 3.5–5.3)
RBC # BLD: 4.31 M/UL — SIGNIFICANT CHANGE UP (ref 4.2–5.8)
RBC # FLD: 13.6 % — SIGNIFICANT CHANGE UP (ref 10.3–14.5)
SODIUM SERPL-SCNC: 134 MMOL/L — LOW (ref 135–145)
SODIUM SERPL-SCNC: 137 MMOL/L — SIGNIFICANT CHANGE UP (ref 135–145)
WBC # BLD: 8.82 K/UL — SIGNIFICANT CHANGE UP (ref 3.8–10.5)
WBC # FLD AUTO: 8.82 K/UL — SIGNIFICANT CHANGE UP (ref 3.8–10.5)

## 2020-10-02 PROCEDURE — 71045 X-RAY EXAM CHEST 1 VIEW: CPT | Mod: 26

## 2020-10-02 RX ORDER — METOPROLOL TARTRATE 50 MG
25 TABLET ORAL EVERY 8 HOURS
Refills: 0 | Status: DISCONTINUED | OUTPATIENT
Start: 2020-10-02 | End: 2020-10-04

## 2020-10-02 RX ORDER — METOPROLOL TARTRATE 50 MG
12.5 TABLET ORAL EVERY 6 HOURS
Refills: 0 | Status: DISCONTINUED | OUTPATIENT
Start: 2020-10-02 | End: 2020-10-02

## 2020-10-02 RX ORDER — ACETAMINOPHEN 500 MG
650 TABLET ORAL EVERY 6 HOURS
Refills: 0 | Status: DISCONTINUED | OUTPATIENT
Start: 2020-10-02 | End: 2020-10-06

## 2020-10-02 RX ADMIN — HEPARIN SODIUM 5000 UNIT(S): 5000 INJECTION INTRAVENOUS; SUBCUTANEOUS at 13:13

## 2020-10-02 RX ADMIN — OXYCODONE AND ACETAMINOPHEN 1 TABLET(S): 5; 325 TABLET ORAL at 06:15

## 2020-10-02 RX ADMIN — Medication 25 MILLIGRAM(S): at 22:28

## 2020-10-02 RX ADMIN — Medication 100 MILLIGRAM(S): at 05:51

## 2020-10-02 RX ADMIN — Medication 25 MILLIGRAM(S): at 16:26

## 2020-10-02 RX ADMIN — Medication 81 MILLIGRAM(S): at 11:23

## 2020-10-02 RX ADMIN — OXYCODONE AND ACETAMINOPHEN 1 TABLET(S): 5; 325 TABLET ORAL at 11:23

## 2020-10-02 RX ADMIN — PANTOPRAZOLE SODIUM 40 MILLIGRAM(S): 20 TABLET, DELAYED RELEASE ORAL at 06:03

## 2020-10-02 RX ADMIN — SENNA PLUS 2 TABLET(S): 8.6 TABLET ORAL at 22:28

## 2020-10-02 RX ADMIN — Medication 12.5 MILLIGRAM(S): at 11:23

## 2020-10-02 RX ADMIN — ATORVASTATIN CALCIUM 40 MILLIGRAM(S): 80 TABLET, FILM COATED ORAL at 22:28

## 2020-10-02 RX ADMIN — OXYCODONE AND ACETAMINOPHEN 1 TABLET(S): 5; 325 TABLET ORAL at 12:15

## 2020-10-02 RX ADMIN — POLYETHYLENE GLYCOL 3350 17 GRAM(S): 17 POWDER, FOR SOLUTION ORAL at 11:23

## 2020-10-02 RX ADMIN — Medication 125 MICROGRAM(S): at 06:03

## 2020-10-02 RX ADMIN — CLOPIDOGREL BISULFATE 75 MILLIGRAM(S): 75 TABLET, FILM COATED ORAL at 11:23

## 2020-10-02 RX ADMIN — OXYCODONE AND ACETAMINOPHEN 1 TABLET(S): 5; 325 TABLET ORAL at 05:14

## 2020-10-02 RX ADMIN — HEPARIN SODIUM 5000 UNIT(S): 5000 INJECTION INTRAVENOUS; SUBCUTANEOUS at 05:51

## 2020-10-02 RX ADMIN — HEPARIN SODIUM 5000 UNIT(S): 5000 INJECTION INTRAVENOUS; SUBCUTANEOUS at 22:28

## 2020-10-02 RX ADMIN — Medication 12.5 MILLIGRAM(S): at 05:51

## 2020-10-02 NOTE — PROGRESS NOTE ADULT - SUBJECTIVE AND OBJECTIVE BOX
Patient discussed on morning rounds with Dr. Clark    Operation / Date: 9/30/2020 MIDCABx1 [place]    SUBJECTIVE ASSESSMENT:  Patient feels "okay" today.  He had some pain just now with the donnell drain removal, but does not want any pain medication at this time.  He walked in the hallway yesterday 2 times.  He had a "small" BM yesterday and passing gas.  Tolerating PO diet.  Denies CP, SOB, dizziness, N/V/D, fever/chills or cough.         Vital Signs Last 24 Hrs  T(C): 37.2 (02 Oct 2020 09:02), Max: 37.7 (02 Oct 2020 05:01)  T(F): 99 (02 Oct 2020 09:02), Max: 99.8 (02 Oct 2020 05:01)  HR: 88 (02 Oct 2020 11:22) (87 - 99)  BP: 136/73 (02 Oct 2020 11:22) (103/63 - 154/81)  BP(mean): 98 (02 Oct 2020 11:22) (75 - 112)  RR: 18 (02 Oct 2020 11:22) (16 - 24)  SpO2: 98% (02 Oct 2020 11:22) (90% - 98%)  I&O's Detail    01 Oct 2020 07:01  -  02 Oct 2020 07:00  --------------------------------------------------------  IN:    IV PiggyBack: 300 mL    Lactated Ringers Bolus: 1000 mL    Oral Fluid: 170 mL  Total IN: 1470 mL    OUT:    Bulb (mL): 100 mL    Indwelling Catheter - Urethral (mL): 50 mL    Voided (mL): 1375 mL  Total OUT: 1525 mL    Total NET: -55 mL      02 Oct 2020 07:01  -  02 Oct 2020 12:37  --------------------------------------------------------  IN:  Total IN: 0 mL    OUT:    Voided (mL): 305 mL  Total OUT: 305 mL    Total NET: -305 mL      CHEST TUBE:  No   DONNELL DRAIN:  No- just removed at bedside.  Patient tolerated well CXR pending.  EPICARDIAL WIRES: No.  TIE DOWNS: Yes  BRICE: No    PHYSICAL EXAM:    GEN: NAD, looks comfortable.  Sitting up in chair.  Rt IJ TLC (to be removed)  Psych: Mood appropriate  Neuro: A&Ox3.  No focal deficits.  Moving all extremities.   HEENT: No obvious abnormalities  CV: S1S2, regular, no murmurs appreciated.  No carotid bruits.  No JVD  Lungs: Clear B/L.  No wheezing, rales or rhonchi  ABD: Soft, non-tender, non-distended.  +Bowel sounds  EXT: Warm and well perfused.  No peripheral edema noted  Musculoskeletal: Moving all extremities with normal ROM, no joint swelling  PV: Pedal pulses palpable  Incision Sites: Left thoracotomy with gauze dressing.  Not saturated or swollen.     LABS:                        14.3   8.82  )-----------( 99       ( 02 Oct 2020 06:26 )             42.2       COUMADIN: No    PT/INR - ( 01 Oct 2020 04:18 )   PT: 14.5 sec;   INR: 1.22          PTT - ( 01 Oct 2020 04:18 )  PTT:26.5 sec    10-02    134<L>  |  99  |  27<H>  ----------------------------<  135<H>  4.0   |  24  |  1.10    Ca    8.6      02 Oct 2020 06:26  Phos  2.0     10-02  Mg     2.1     10-02    TPro  5.9<L>  /  Alb  3.4  /  TBili  0.4  /  DBili  x   /  AST  31  /  ALT  27  /  AlkPhos  47  10-01          MEDICATIONS  (STANDING):  aspirin enteric coated 81 milliGRAM(s) Oral daily  atorvastatin 40 milliGRAM(s) Oral at bedtime  clopidogrel Tablet 75 milliGRAM(s) Oral daily  dextrose 50% Injectable 12.5 Gram(s) IV Push once  dextrose 50% Injectable 25 Gram(s) IV Push once  dextrose 50% Injectable 25 Gram(s) IV Push once  heparin   Injectable 5000 Unit(s) SubCutaneous every 8 hours  levothyroxine 125 MICROGram(s) Oral daily  metoprolol tartrate 12.5 milliGRAM(s) Oral every 6 hours  pantoprazole    Tablet 40 milliGRAM(s) Oral before breakfast  polyethylene glycol 3350 17 Gram(s) Oral daily  senna 2 Tablet(s) Oral at bedtime  sodium chloride 0.9%. 1000 milliLiter(s) (10 mL/Hr) IV Continuous <Continuous>    MEDICATIONS  (PRN):  ketorolac   Injectable 15 milliGRAM(s) IV Push every 6 hours PRN Moderate Pain (4 - 6)  oxycodone    5 mG/acetaminophen 325 mG 1 Tablet(s) Oral every 6 hours PRN Moderate Pain (4 - 6)        RADIOLOGY & ADDITIONAL TESTS:    x< from: Xray Chest 1 View AP/PA (10.02.20 @ 03:22) >    Findings/  impression: Stable positioning of support devices. Stable bilateral opacities and heart size. Decreased elevation of the right hemidiaphragm.    < end of copied text >

## 2020-10-02 NOTE — DIETITIAN INITIAL EVALUATION ADULT. - ADD RECOMMEND
1. Encourage intake through day 2. Manage pain prn 3. Trend wts 4. Monitor and replete lytes 5. Reinforce ed

## 2020-10-02 NOTE — DIETITIAN INITIAL EVALUATION ADULT. - OTHER INFO
70M with PMHx of HTN, HLD, thyroid cancer s/p thyroidectomy with XRT on 2018 with subsequent hypothyroidism (now on synthroid), h/o DVT R leg after flight to [facility] 2/2020 (on Xarelto with plan to complete 9 month course, last dose Thursday 9/24/2020; pt reports having a negative duplex US about 3wks ago. 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 Patient now s/p MIDCAB 9/30 procedure awaiting PCI with Dr. Coughlin, moved to  yesterday 10/1 after extubation. At time of assessment pt resting in chair noting appetite is fair, at home cooks "healthy" and is very active. Consumes mainly grilled meats, uses air fryer for other foods, avoids shellfish. Endorses pain/ discomfort when coughing on L side d/t tube in place, reports bloody output. Endorses wt has been stable, denies n/v/d/c, skin with surgical incision to chest, adrienne score 21. Encouraged intake through day to meet needs, discussed purpose of DASH diet, receptive. Will continue to follow per protocol and reinforce ed prn.

## 2020-10-02 NOTE — PROGRESS NOTE ADULT - ASSESSMENT
71 y/o male with PMHx of HTN, HLD, thyroid cancer s/p thyroidectomy with XRT on 2018 with subsequent hypothyroidism (now on synthroid), h/o DVT R leg after flight to [facility] 2/2020 (on Xarelto with plan to complete 9 month course), last dose Thursday 9/24/2020; pt reports having a negative duplex US about 3wks ago. 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  MIDCAB done 9/30/20 uneventful, he is now on 9LA doing well.  Remaining donnell drain just removed, CXR pending.  Per Dr. Clark on rounds this am, patient does NOT need to continue his NOAC for his old DVT.  Just DAPT for now along wtih SC heparin.  He will have PCI this Monday.        Neuro   -Pain controlled well with current regimen (Toradol, Tylenol and Percocet PRN)     Cardiology   -CAD- continue ASA, Plavix for acute graft prophylaxis  -HTN- Continue Lopressor, titrated dose up. HR and BP can tolerate.   -HLD - continue statin for long term graft prophyalxis     Pulm  -Oxygenating well on NC 2L, wean as tolerated.   -Encouraged IS/Cough and deep breathing.  So far pulling 500mL on IS.      GI  - protonix for GI prophylaxis  -Senna and miralax for bowel regimen      -Creat/BUN both slightly up today, ?dry.  Encouraged PO fluid intake.  Will repeat BMP this afternoon.     Heme  -H/H stable    ID  -No issues.     Preventative measures   continue SCD while in bed  heparin sq for DVT prophylaxis  Miralalax, senna for bowl regimine  protonix for GI protection    TLC to be removed.

## 2020-10-02 NOTE — DIETITIAN INITIAL EVALUATION ADULT. - ENERGY NEEDS
ABW used for calculations as pt between % of IBW.   ABW 71.7kg, IBW 72kg, 99% IBW, ht 69", BMI 23.3   Nutrient needs based on [facility] standards of care for maintenance in adults, adjusted for post-op needs, age, fluid per team

## 2020-10-03 LAB
ANION GAP SERPL CALC-SCNC: 11 MMOL/L — SIGNIFICANT CHANGE UP (ref 5–17)
BUN SERPL-MCNC: 17 MG/DL — SIGNIFICANT CHANGE UP (ref 7–23)
CALCIUM SERPL-MCNC: 8.4 MG/DL — SIGNIFICANT CHANGE UP (ref 8.4–10.5)
CHLORIDE SERPL-SCNC: 101 MMOL/L — SIGNIFICANT CHANGE UP (ref 96–108)
CO2 SERPL-SCNC: 24 MMOL/L — SIGNIFICANT CHANGE UP (ref 22–31)
CREAT SERPL-MCNC: 0.76 MG/DL — SIGNIFICANT CHANGE UP (ref 0.5–1.3)
GLUCOSE SERPL-MCNC: 129 MG/DL — HIGH (ref 70–99)
HCT VFR BLD CALC: 41.7 % — SIGNIFICANT CHANGE UP (ref 39–50)
HGB BLD-MCNC: 13.6 G/DL — SIGNIFICANT CHANGE UP (ref 13–17)
MAGNESIUM SERPL-MCNC: 2.1 MG/DL — SIGNIFICANT CHANGE UP (ref 1.6–2.6)
MCHC RBC-ENTMCNC: 32.3 PG — SIGNIFICANT CHANGE UP (ref 27–34)
MCHC RBC-ENTMCNC: 32.6 GM/DL — SIGNIFICANT CHANGE UP (ref 32–36)
MCV RBC AUTO: 99 FL — SIGNIFICANT CHANGE UP (ref 80–100)
NRBC # BLD: 0 /100 WBCS — SIGNIFICANT CHANGE UP (ref 0–0)
PLATELET # BLD AUTO: 87 K/UL — LOW (ref 150–400)
POTASSIUM SERPL-MCNC: 4.3 MMOL/L — SIGNIFICANT CHANGE UP (ref 3.5–5.3)
POTASSIUM SERPL-SCNC: 4.3 MMOL/L — SIGNIFICANT CHANGE UP (ref 3.5–5.3)
RBC # BLD: 4.21 M/UL — SIGNIFICANT CHANGE UP (ref 4.2–5.8)
RBC # FLD: 13.4 % — SIGNIFICANT CHANGE UP (ref 10.3–14.5)
SODIUM SERPL-SCNC: 136 MMOL/L — SIGNIFICANT CHANGE UP (ref 135–145)
WBC # BLD: 8.08 K/UL — SIGNIFICANT CHANGE UP (ref 3.8–10.5)
WBC # FLD AUTO: 8.08 K/UL — SIGNIFICANT CHANGE UP (ref 3.8–10.5)

## 2020-10-03 PROCEDURE — 71046 X-RAY EXAM CHEST 2 VIEWS: CPT | Mod: 26

## 2020-10-03 RX ADMIN — HEPARIN SODIUM 5000 UNIT(S): 5000 INJECTION INTRAVENOUS; SUBCUTANEOUS at 06:17

## 2020-10-03 RX ADMIN — Medication 81 MILLIGRAM(S): at 11:56

## 2020-10-03 RX ADMIN — CLOPIDOGREL BISULFATE 75 MILLIGRAM(S): 75 TABLET, FILM COATED ORAL at 11:56

## 2020-10-03 RX ADMIN — HEPARIN SODIUM 5000 UNIT(S): 5000 INJECTION INTRAVENOUS; SUBCUTANEOUS at 22:12

## 2020-10-03 RX ADMIN — Medication 125 MICROGRAM(S): at 06:17

## 2020-10-03 RX ADMIN — PANTOPRAZOLE SODIUM 40 MILLIGRAM(S): 20 TABLET, DELAYED RELEASE ORAL at 06:16

## 2020-10-03 RX ADMIN — SENNA PLUS 2 TABLET(S): 8.6 TABLET ORAL at 22:11

## 2020-10-03 RX ADMIN — Medication 25 MILLIGRAM(S): at 13:10

## 2020-10-03 RX ADMIN — Medication 25 MILLIGRAM(S): at 22:11

## 2020-10-03 RX ADMIN — ATORVASTATIN CALCIUM 40 MILLIGRAM(S): 80 TABLET, FILM COATED ORAL at 22:12

## 2020-10-03 RX ADMIN — HEPARIN SODIUM 5000 UNIT(S): 5000 INJECTION INTRAVENOUS; SUBCUTANEOUS at 13:10

## 2020-10-03 RX ADMIN — Medication 25 MILLIGRAM(S): at 06:17

## 2020-10-03 RX ADMIN — POLYETHYLENE GLYCOL 3350 17 GRAM(S): 17 POWDER, FOR SOLUTION ORAL at 11:56

## 2020-10-03 NOTE — PROGRESS NOTE ADULT - ASSESSMENT
69 y/o male with PMHx of HTN, HLD, thyroid cancer s/p thyroidectomy with XRT on 2018 with subsequent hypothyroidism (now on synthroid), h/o DVT R leg after flight to [facility] 2/2020 (on Xarelto with plan to complete 9 month course), last dose Thursday 9/24/2020; pt reports having a negative duplex US about 3wks ago. 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  MIDCAB done 9/30/20 uneventful, he is now on 9LA doing well.  Remaining donnell drain just removed, CXR pending.  Per Dr. Clark on rounds this am, patient does NOT need to continue his NOAC for his old DVT.  Just DAPT for now along wtih SC heparin.  He will have PCI this Monday.        Neuro   -Pain controlled well with current regimen (Toradol, Tylenol and Percocet PRN)     Cardiology   -CAD- continue ASA, Plavix for acute graft prophylaxis  -HTN- Continue Lopressor, titrated dose up. HR and BP can tolerate.   -HLD - continue statin for long term graft prophyalxis     Pulm  -Oxygenating well on NC 2L, wean as tolerated.   -Encouraged IS/Cough and deep breathing.  So far pulling 500mL on IS.      GI  - protonix for GI prophylaxis  -Senna and miralax for bowel regimen      -Creat/BUN both slightly up today, ?dry.  Encouraged PO fluid intake.  Will repeat BMP this afternoon.     Heme  -H/H stable    ID  -No issues.     Preventative measures   continue SCD while in bed  heparin sq for DVT prophylaxis  Miralalax, senna for bowl regimine  protonix for GI protection     71 y/o male with PMHx of HTN, HLD, thyroid cancer s/p thyroidectomy with XRT on 2018 with subsequent hypothyroidism (now on synthroid), h/o DVT R leg after flight to [facility] 2/2020 (on Xarelto with plan to complete 9 month course), last dose Thursday 9/24/2020; pt reports having a negative duplex US about 3wks ago. 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  MIDCAB done 9/30/20 uneventful, he is now on 9LA doing well.  Jose tube removed POD1 and donnell removed POD2. Will not resume home NOAC for prior DVT per Dr. Clark. Will continue with DAPT for now along wtih SC heparin.  Plan for PCI this Monday.        Neuro   -Pain controlled well with current regimen (Toradol, Tylenol and Percocet PRN)     Cardiology   -CAD s/p MIDCAB    - continue ASA, Plavix, Lopressor, statin  -HLD - continue statin    Pulm  -Oxygenating well on NC 2L, wean as tolerated.   -Encouraged IS/Cough and deep breathing.  Pulling 1000 on ISS today   - CXR with low lung volumes. Encourage pulm toilet.    GI  - protonix for GI prophylaxis  -Senna and miralax for bowel regimen  - PO DASH diet      - BUN/Cr 17/0.76. Euvolemic on exam.     Heme  -H/H stable  - Plts downtrending. Cont to monitor.  - SQ heparin for DVT ppx, SCDs while in bed.    ID  -No issues.   - Afebrile. WBC 8.      Dispo:  -Plan for PCI on Monday  - Home when medically ready. ?Tuesday.

## 2020-10-03 NOTE — PROGRESS NOTE ADULT - SUBJECTIVE AND OBJECTIVE BOX
Patient discussed on morning rounds with Dr. Radford    Operation / Date: 9/30/20: Clark - JAYLEN    SUBJECTIVE ASSESSMENT:  70y Male        Vital Signs Last 24 Hrs  T(C): 37.1 (03 Oct 2020 13:41), Max: 37.1 (03 Oct 2020 13:41)  T(F): 98.8 (03 Oct 2020 13:41), Max: 98.8 (03 Oct 2020 13:41)  HR: 91 (03 Oct 2020 13:11) (82 - 92)  BP: 131/70 (03 Oct 2020 13:11) (129/71 - 153/79)  BP(mean): 94 (03 Oct 2020 13:11) (94 - 108)  RR: 18 (03 Oct 2020 13:11) (16 - 20)  SpO2: 94% (03 Oct 2020 13:11) (91% - 95%)  I&O's Detail    02 Oct 2020 07:01  -  03 Oct 2020 07:00  --------------------------------------------------------  IN:    Oral Fluid: 80 mL  Total IN: 80 mL    OUT:    Voided (mL): 1025 mL  Total OUT: 1025 mL    Total NET: -945 mL      03 Oct 2020 07:01  -  03 Oct 2020 14:39  --------------------------------------------------------  IN:  Total IN: 0 mL    OUT:    Voided (mL): 500 mL  Total OUT: 500 mL    Total NET: -500 mL          CHEST TUBE:  Yes/No. AIR LEAKS: Yes/No. Suction / H2O SEAL.   CARLOS MANUEL DRAIN:  Yes/No.  EPICARDIAL WIRES: Yes/No.  TIE DOWNS: Yes/No.  BRICE: Yes/No.    PHYSICAL EXAM:    General:     Neurological:    Cardiovascular:    Respiratory:    Gastrointestinal:    Extremities:    Vascular:    Incision Sites:    LABS:                        13.6   8.08  )-----------( 87       ( 03 Oct 2020 05:35 )             41.7       COUMADIN:  Yes/No. REASON: .        10-03    136  |  101  |  17  ----------------------------<  129<H>  4.3   |  24  |  0.76    Ca    8.4      03 Oct 2020 05:35  Phos  2.0     10-02  Mg     2.1     10-03            MEDICATIONS  (STANDING):  aspirin enteric coated 81 milliGRAM(s) Oral daily  atorvastatin 40 milliGRAM(s) Oral at bedtime  clopidogrel Tablet 75 milliGRAM(s) Oral daily  dextrose 50% Injectable 12.5 Gram(s) IV Push once  dextrose 50% Injectable 25 Gram(s) IV Push once  dextrose 50% Injectable 25 Gram(s) IV Push once  heparin   Injectable 5000 Unit(s) SubCutaneous every 8 hours  levothyroxine 125 MICROGram(s) Oral daily  metoprolol tartrate 25 milliGRAM(s) Oral every 8 hours  pantoprazole    Tablet 40 milliGRAM(s) Oral before breakfast  polyethylene glycol 3350 17 Gram(s) Oral daily  senna 2 Tablet(s) Oral at bedtime  sodium chloride 0.9%. 1000 milliLiter(s) (10 mL/Hr) IV Continuous <Continuous>    MEDICATIONS  (PRN):  acetaminophen   Tablet .. 650 milliGRAM(s) Oral every 6 hours PRN Mild Pain (1 - 3)  ketorolac   Injectable 15 milliGRAM(s) IV Push every 6 hours PRN Moderate Pain (4 - 6)  oxycodone    5 mG/acetaminophen 325 mG 1 Tablet(s) Oral every 6 hours PRN Moderate Pain (4 - 6)        RADIOLOGY & ADDITIONAL TESTS:     Patient discussed on morning rounds with Dr. Radford    Operation / Date: 9/30/20: Eduardo YORK    SUBJECTIVE ASSESSMENT:  70y Male seen and examined sitting up in chair. Complaining of sputum which is clear. No other complaints. No SOB, palpitations, HDZ. Pulling 500 on ISS.         Vital Signs Last 24 Hrs  T(C): 37.1 (03 Oct 2020 13:41), Max: 37.1 (03 Oct 2020 13:41)  T(F): 98.8 (03 Oct 2020 13:41), Max: 98.8 (03 Oct 2020 13:41)  HR: 91 (03 Oct 2020 13:11) (82 - 92)  BP: 131/70 (03 Oct 2020 13:11) (129/71 - 153/79)  BP(mean): 94 (03 Oct 2020 13:11) (94 - 108)  RR: 18 (03 Oct 2020 13:11) (16 - 20)  SpO2: 94% (03 Oct 2020 13:11) (91% - 95%)  I&O's Detail    02 Oct 2020 07:01  -  03 Oct 2020 07:00  --------------------------------------------------------  IN:    Oral Fluid: 80 mL  Total IN: 80 mL    OUT:    Voided (mL): 1025 mL  Total OUT: 1025 mL    Total NET: -945 mL      03 Oct 2020 07:01  -  03 Oct 2020 14:39  --------------------------------------------------------  IN:  Total IN: 0 mL    OUT:    Voided (mL): 500 mL  Total OUT: 500 mL    Total NET: -500 mL          CHEST TUBE:  No  CARLOS MANUEL DRAIN:  No.  EPICARDIAL WIRES: No.  TIE DOWNS: Yes.  BRICE: No.    PHYSICAL EXAM:  Appearance: No acute distress.  Neurologic: AAOx3, no AMS or focal deficits.  Responds appropriately to verbal and physical stimuli; exhibits purposeful movement in all extremities.  Cardiovascular: RRR  Respiratory: CTAB  Gastrointestinal:  Soft, non-tender, non-distended, + BS.	  Extremities: warm, well perfused, trace peripheral edema  Incision Sites: thoracotomy CDI    LABS:                        13.6   8.08  )-----------( 87       ( 03 Oct 2020 05:35 )             41.7       COUMADIN:  No        10-03    136  |  101  |  17  ----------------------------<  129<H>  4.3   |  24  |  0.76    Ca    8.4      03 Oct 2020 05:35  Phos  2.0     10-02  Mg     2.1     10-03            MEDICATIONS  (STANDING):  aspirin enteric coated 81 milliGRAM(s) Oral daily  atorvastatin 40 milliGRAM(s) Oral at bedtime  clopidogrel Tablet 75 milliGRAM(s) Oral daily  dextrose 50% Injectable 12.5 Gram(s) IV Push once  dextrose 50% Injectable 25 Gram(s) IV Push once  dextrose 50% Injectable 25 Gram(s) IV Push once  heparin   Injectable 5000 Unit(s) SubCutaneous every 8 hours  levothyroxine 125 MICROGram(s) Oral daily  metoprolol tartrate 25 milliGRAM(s) Oral every 8 hours  pantoprazole    Tablet 40 milliGRAM(s) Oral before breakfast  polyethylene glycol 3350 17 Gram(s) Oral daily  senna 2 Tablet(s) Oral at bedtime  sodium chloride 0.9%. 1000 milliLiter(s) (10 mL/Hr) IV Continuous <Continuous>    MEDICATIONS  (PRN):  acetaminophen   Tablet .. 650 milliGRAM(s) Oral every 6 hours PRN Mild Pain (1 - 3)  ketorolac   Injectable 15 milliGRAM(s) IV Push every 6 hours PRN Moderate Pain (4 - 6)  oxycodone    5 mG/acetaminophen 325 mG 1 Tablet(s) Oral every 6 hours PRN Moderate Pain (4 - 6)        RADIOLOGY & ADDITIONAL TESTS:

## 2020-10-04 LAB
ANION GAP SERPL CALC-SCNC: 9 MMOL/L — SIGNIFICANT CHANGE UP (ref 5–17)
BUN SERPL-MCNC: 16 MG/DL — SIGNIFICANT CHANGE UP (ref 7–23)
CALCIUM SERPL-MCNC: 8.8 MG/DL — SIGNIFICANT CHANGE UP (ref 8.4–10.5)
CHLORIDE SERPL-SCNC: 102 MMOL/L — SIGNIFICANT CHANGE UP (ref 96–108)
CO2 SERPL-SCNC: 28 MMOL/L — SIGNIFICANT CHANGE UP (ref 22–31)
CREAT SERPL-MCNC: 0.86 MG/DL — SIGNIFICANT CHANGE UP (ref 0.5–1.3)
GLUCOSE SERPL-MCNC: 146 MG/DL — HIGH (ref 70–99)
HCT VFR BLD CALC: 42.5 % — SIGNIFICANT CHANGE UP (ref 39–50)
HGB BLD-MCNC: 14.2 G/DL — SIGNIFICANT CHANGE UP (ref 13–17)
MAGNESIUM SERPL-MCNC: 2.1 MG/DL — SIGNIFICANT CHANGE UP (ref 1.6–2.6)
MCHC RBC-ENTMCNC: 32.8 PG — SIGNIFICANT CHANGE UP (ref 27–34)
MCHC RBC-ENTMCNC: 33.4 GM/DL — SIGNIFICANT CHANGE UP (ref 32–36)
MCV RBC AUTO: 98.2 FL — SIGNIFICANT CHANGE UP (ref 80–100)
NRBC # BLD: 0 /100 WBCS — SIGNIFICANT CHANGE UP (ref 0–0)
PLATELET # BLD AUTO: 114 K/UL — LOW (ref 150–400)
POTASSIUM SERPL-MCNC: 4.2 MMOL/L — SIGNIFICANT CHANGE UP (ref 3.5–5.3)
POTASSIUM SERPL-SCNC: 4.2 MMOL/L — SIGNIFICANT CHANGE UP (ref 3.5–5.3)
RBC # BLD: 4.33 M/UL — SIGNIFICANT CHANGE UP (ref 4.2–5.8)
RBC # FLD: 13.1 % — SIGNIFICANT CHANGE UP (ref 10.3–14.5)
SODIUM SERPL-SCNC: 139 MMOL/L — SIGNIFICANT CHANGE UP (ref 135–145)
WBC # BLD: 7.46 K/UL — SIGNIFICANT CHANGE UP (ref 3.8–10.5)
WBC # FLD AUTO: 7.46 K/UL — SIGNIFICANT CHANGE UP (ref 3.8–10.5)

## 2020-10-04 PROCEDURE — 71045 X-RAY EXAM CHEST 1 VIEW: CPT | Mod: 26

## 2020-10-04 RX ORDER — METOPROLOL TARTRATE 50 MG
50 TABLET ORAL EVERY 12 HOURS
Refills: 0 | Status: DISCONTINUED | OUTPATIENT
Start: 2020-10-04 | End: 2020-10-06

## 2020-10-04 RX ADMIN — ATORVASTATIN CALCIUM 40 MILLIGRAM(S): 80 TABLET, FILM COATED ORAL at 22:42

## 2020-10-04 RX ADMIN — CLOPIDOGREL BISULFATE 75 MILLIGRAM(S): 75 TABLET, FILM COATED ORAL at 11:13

## 2020-10-04 RX ADMIN — Medication 125 MICROGRAM(S): at 06:48

## 2020-10-04 RX ADMIN — HEPARIN SODIUM 5000 UNIT(S): 5000 INJECTION INTRAVENOUS; SUBCUTANEOUS at 15:13

## 2020-10-04 RX ADMIN — PANTOPRAZOLE SODIUM 40 MILLIGRAM(S): 20 TABLET, DELAYED RELEASE ORAL at 06:48

## 2020-10-04 RX ADMIN — Medication 81 MILLIGRAM(S): at 11:13

## 2020-10-04 RX ADMIN — Medication 50 MILLIGRAM(S): at 22:43

## 2020-10-04 RX ADMIN — HEPARIN SODIUM 5000 UNIT(S): 5000 INJECTION INTRAVENOUS; SUBCUTANEOUS at 22:42

## 2020-10-04 RX ADMIN — HEPARIN SODIUM 5000 UNIT(S): 5000 INJECTION INTRAVENOUS; SUBCUTANEOUS at 06:48

## 2020-10-04 RX ADMIN — Medication 25 MILLIGRAM(S): at 15:13

## 2020-10-04 RX ADMIN — Medication 25 MILLIGRAM(S): at 06:48

## 2020-10-04 NOTE — PROGRESS NOTE ADULT - ASSESSMENT
71 y/o male with PMHx of HTN, HLD, thyroid cancer s/p thyroidectomy with XRT on 2018 with subsequent hypothyroidism (now on synthroid), h/o DVT R leg after flight to [facility] 2/2020 (on Xarelto with plan to complete 9 month course), last dose Thursday 9/24/2020; pt reports having a negative duplex US about 3wks ago. 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  MIDCAB done 9/30/20 uneventful, he is now on 9LA doing well.  Jose tube removed POD1 and donnell removed POD2. Will not resume home NOAC for prior DVT per Dr. Clark. Will continue with DAPT for now along with SC heparin.  Plan for PCI this Monday.        Neuro   -Pain controlled well with current regimen (Toradol, Tylenol and Percocet PRN)     Cardiology   -CAD s/p MIDCAB    - continue ASA, Plavix, Lopressor, statin  -HLD - continue statin    Pulm  -Oxygenating well on NC 2L, wean as tolerated.   -Encouraged IS/Cough and deep breathing.    - CXR with low lung volumes. Encourage pulm toilet.    GI  - protonix for GI prophylaxis  -Senna and miralax for bowel regimen. BM this AM.  - PO DASH diet      - BUN/Cr 16/0.86. Euvolemic on exam.     Heme  -H/H stable  - SQ heparin for DVT ppx, SCDs while in bed.    ID  -No issues.   - Afebrile. WBC 8.      Dispo:  -Plan for PCI on Monday  - Home when medically ready. ?Tuesday.

## 2020-10-04 NOTE — PROGRESS NOTE ADULT - SUBJECTIVE AND OBJECTIVE BOX
Patient discussed on morning rounds with Dr. Radford    Operation / Date: 9/30 MIDCAB    SUBJECTIVE ASSESSMENT:  70y Male seen and examined sitting in chair. Pulling 750cc on ISS. Discussed importance of walking and ISS. Patient agrees to walk and use ISS today.        Vital Signs Last 24 Hrs  T(C): 37.1 (04 Oct 2020 13:45), Max: 37.6 (04 Oct 2020 01:16)  T(F): 98.7 (04 Oct 2020 13:45), Max: 99.6 (04 Oct 2020 01:16)  HR: 95 (04 Oct 2020 15:11) (80 - 95)  BP: 139/73 (04 Oct 2020 15:11) (123/67 - 139/73)  BP(mean): 100 (04 Oct 2020 15:11) (88 - 100)  RR: 17 (04 Oct 2020 15:11) (16 - 18)  SpO2: 95% (04 Oct 2020 15:11) (92% - 96%)  I&O's Detail    03 Oct 2020 07:01  -  04 Oct 2020 07:00  --------------------------------------------------------  IN:    Oral Fluid: 740 mL  Total IN: 740 mL    OUT:    Voided (mL): 1425 mL  Total OUT: 1425 mL    Total NET: -685 mL          CHEST TUBE:  No  CARLOS MANUEL DRAIN:  No.  EPICARDIAL WIRES: No.  TIE DOWNS: No.  BRICE: No.    PHYSICAL EXAM:  Appearance: No acute distress.  Neurologic: AAOx3, no AMS or focal deficits.  Responds appropriately to verbal and physical stimuli; exhibits purposeful movement in all extremities.  Cardiovascular: RRR, S1 S2. No m/r/g.  Respiratory: CTAB  Gastrointestinal:  Soft, non-tender, non-distended, + BS.	  Extremities: warm, well perfused. Trace peripheral edema.  Incision Sites: thoracotomy site CDI    LABS:                        14.2   7.46  )-----------( 114      ( 04 Oct 2020 06:44 )             42.5       COUMADIN:  No        10-04    139  |  102  |  16  ----------------------------<  146<H>  4.2   |  28  |  0.86    Ca    8.8      04 Oct 2020 06:44  Mg     2.1     10-04            MEDICATIONS  (STANDING):  aspirin enteric coated 81 milliGRAM(s) Oral daily  atorvastatin 40 milliGRAM(s) Oral at bedtime  clopidogrel Tablet 75 milliGRAM(s) Oral daily  heparin   Injectable 5000 Unit(s) SubCutaneous every 8 hours  levothyroxine 125 MICROGram(s) Oral daily  metoprolol tartrate 25 milliGRAM(s) Oral every 8 hours  pantoprazole    Tablet 40 milliGRAM(s) Oral before breakfast  polyethylene glycol 3350 17 Gram(s) Oral daily  senna 2 Tablet(s) Oral at bedtime    MEDICATIONS  (PRN):  acetaminophen   Tablet .. 650 milliGRAM(s) Oral every 6 hours PRN Mild Pain (1 - 3)  ketorolac   Injectable 15 milliGRAM(s) IV Push every 6 hours PRN Moderate Pain (4 - 6)  oxycodone    5 mG/acetaminophen 325 mG 1 Tablet(s) Oral every 6 hours PRN Moderate Pain (4 - 6)        RADIOLOGY & ADDITIONAL TESTS:

## 2020-10-05 ENCOUNTER — APPOINTMENT (OUTPATIENT)
Dept: SURGERY | Facility: CLINIC | Age: 70
End: 2020-10-05

## 2020-10-05 LAB
ANION GAP SERPL CALC-SCNC: 14 MMOL/L — SIGNIFICANT CHANGE UP (ref 5–17)
APTT BLD: 29.5 SEC — SIGNIFICANT CHANGE UP (ref 27.5–35.5)
BLD GP AB SCN SERPL QL: NEGATIVE — SIGNIFICANT CHANGE UP
BUN SERPL-MCNC: 18 MG/DL — SIGNIFICANT CHANGE UP (ref 7–23)
CALCIUM SERPL-MCNC: 9.3 MG/DL — SIGNIFICANT CHANGE UP (ref 8.4–10.5)
CHLORIDE SERPL-SCNC: 101 MMOL/L — SIGNIFICANT CHANGE UP (ref 96–108)
CO2 SERPL-SCNC: 26 MMOL/L — SIGNIFICANT CHANGE UP (ref 22–31)
CREAT SERPL-MCNC: 0.83 MG/DL — SIGNIFICANT CHANGE UP (ref 0.5–1.3)
GLUCOSE BLDC GLUCOMTR-MCNC: 118 MG/DL — HIGH (ref 70–99)
GLUCOSE SERPL-MCNC: 137 MG/DL — HIGH (ref 70–99)
HCT VFR BLD CALC: 49.5 % — SIGNIFICANT CHANGE UP (ref 39–50)
HGB BLD-MCNC: 16.7 G/DL — SIGNIFICANT CHANGE UP (ref 13–17)
INR BLD: 1.05 — SIGNIFICANT CHANGE UP (ref 0.88–1.16)
MAGNESIUM SERPL-MCNC: 2.2 MG/DL — SIGNIFICANT CHANGE UP (ref 1.6–2.6)
MCHC RBC-ENTMCNC: 32.9 PG — SIGNIFICANT CHANGE UP (ref 27–34)
MCHC RBC-ENTMCNC: 33.7 GM/DL — SIGNIFICANT CHANGE UP (ref 32–36)
MCV RBC AUTO: 97.6 FL — SIGNIFICANT CHANGE UP (ref 80–100)
NRBC # BLD: 0 /100 WBCS — SIGNIFICANT CHANGE UP (ref 0–0)
PLATELET # BLD AUTO: 140 K/UL — LOW (ref 150–400)
POTASSIUM SERPL-MCNC: 4.4 MMOL/L — SIGNIFICANT CHANGE UP (ref 3.5–5.3)
POTASSIUM SERPL-SCNC: 4.4 MMOL/L — SIGNIFICANT CHANGE UP (ref 3.5–5.3)
PROTHROM AB SERPL-ACNC: 12.6 SEC — SIGNIFICANT CHANGE UP (ref 10.6–13.6)
RBC # BLD: 5.07 M/UL — SIGNIFICANT CHANGE UP (ref 4.2–5.8)
RBC # FLD: 13.2 % — SIGNIFICANT CHANGE UP (ref 10.3–14.5)
RH IG SCN BLD-IMP: POSITIVE — SIGNIFICANT CHANGE UP
SODIUM SERPL-SCNC: 141 MMOL/L — SIGNIFICANT CHANGE UP (ref 135–145)
WBC # BLD: 6.76 K/UL — SIGNIFICANT CHANGE UP (ref 3.8–10.5)
WBC # FLD AUTO: 6.76 K/UL — SIGNIFICANT CHANGE UP (ref 3.8–10.5)

## 2020-10-05 PROCEDURE — 71045 X-RAY EXAM CHEST 1 VIEW: CPT | Mod: 26

## 2020-10-05 PROCEDURE — 92928 PRQ TCAT PLMT NTRAC ST 1 LES: CPT | Mod: LC

## 2020-10-05 PROCEDURE — 93454 CORONARY ARTERY ANGIO S&I: CPT | Mod: 26,59

## 2020-10-05 RX ORDER — DIPHENHYDRAMINE HCL 50 MG
50 CAPSULE ORAL ONCE
Refills: 0 | Status: DISCONTINUED | OUTPATIENT
Start: 2020-10-05 | End: 2020-10-05

## 2020-10-05 RX ORDER — CLOPIDOGREL BISULFATE 75 MG/1
300 TABLET, FILM COATED ORAL ONCE
Refills: 0 | Status: DISCONTINUED | OUTPATIENT
Start: 2020-10-05 | End: 2020-10-05

## 2020-10-05 RX ORDER — SODIUM CHLORIDE 9 MG/ML
500 INJECTION INTRAMUSCULAR; INTRAVENOUS; SUBCUTANEOUS
Refills: 0 | Status: DISCONTINUED | OUTPATIENT
Start: 2020-10-05 | End: 2020-10-06

## 2020-10-05 RX ORDER — HYDROCORTISONE 20 MG
200 TABLET ORAL ONCE
Refills: 0 | Status: COMPLETED | OUTPATIENT
Start: 2020-10-05 | End: 2020-10-05

## 2020-10-05 RX ADMIN — Medication 125 MICROGRAM(S): at 06:10

## 2020-10-05 RX ADMIN — Medication 50 MILLIGRAM(S): at 07:06

## 2020-10-05 RX ADMIN — HEPARIN SODIUM 5000 UNIT(S): 5000 INJECTION INTRAVENOUS; SUBCUTANEOUS at 21:43

## 2020-10-05 RX ADMIN — SODIUM CHLORIDE 75 MILLILITER(S): 9 INJECTION INTRAMUSCULAR; INTRAVENOUS; SUBCUTANEOUS at 09:58

## 2020-10-05 RX ADMIN — Medication 50 MILLIGRAM(S): at 21:43

## 2020-10-05 RX ADMIN — OXYCODONE AND ACETAMINOPHEN 1 TABLET(S): 5; 325 TABLET ORAL at 11:43

## 2020-10-05 RX ADMIN — PANTOPRAZOLE SODIUM 40 MILLIGRAM(S): 20 TABLET, DELAYED RELEASE ORAL at 06:11

## 2020-10-05 RX ADMIN — HEPARIN SODIUM 5000 UNIT(S): 5000 INJECTION INTRAVENOUS; SUBCUTANEOUS at 06:10

## 2020-10-05 RX ADMIN — CLOPIDOGREL BISULFATE 75 MILLIGRAM(S): 75 TABLET, FILM COATED ORAL at 07:06

## 2020-10-05 RX ADMIN — Medication 81 MILLIGRAM(S): at 07:06

## 2020-10-05 RX ADMIN — OXYCODONE AND ACETAMINOPHEN 1 TABLET(S): 5; 325 TABLET ORAL at 12:40

## 2020-10-05 RX ADMIN — ATORVASTATIN CALCIUM 40 MILLIGRAM(S): 80 TABLET, FILM COATED ORAL at 21:43

## 2020-10-05 RX ADMIN — Medication 200 MILLIGRAM(S): at 07:35

## 2020-10-05 NOTE — PROGRESS NOTE ADULT - SUBJECTIVE AND OBJECTIVE BOX
Patient discussed on morning rounds with Dr. Clark    Operation / Date: 9/30 MIDCAB (LIMA-LAD), EF 60%  10/5: AISLINN to dLCx     SUBJECTIVE ASSESSMENT:  70y Male assessed at bedside post cardiac catheterization. Patient has no acute complaints, denies all pain. Has no SOB, is walking easily on RA. Is using IS (1000 cc) and endorses BM yesterday. Feels ready for discharge home tomorrow.     Vital Signs Last 24 Hrs  T(C): 36.7 (05 Oct 2020 17:20), Max: 37.4 (04 Oct 2020 21:24)  T(F): 98 (05 Oct 2020 17:20), Max: 99.4 (04 Oct 2020 21:24)  HR: 84 (05 Oct 2020 16:11) (66 - 88)  BP: 140/94 (05 Oct 2020 15:41) (106/69 - 147/76)  BP(mean): 113 (05 Oct 2020 15:41) (82 - 113)  RR: 16 (05 Oct 2020 15:41) (16 - 18)  SpO2: 95% (05 Oct 2020 15:41) (93% - 95%)  I&O's Detail    04 Oct 2020 07:01  -  05 Oct 2020 07:00  --------------------------------------------------------  IN:    Oral Fluid: 300 mL  Total IN: 300 mL    OUT:    Voided (mL): 1400 mL  Total OUT: 1400 mL    Total NET: -1100 mL      05 Oct 2020 07:01  -  05 Oct 2020 20:50  --------------------------------------------------------  IN:    sodium chloride 0.9%: 300 mL  Total IN: 300 mL    OUT:    Voided (mL): 900 mL  Total OUT: 900 mL    Total NET: -600 mL      CHEST TUBE:  No.  DONNELL DRAIN:  No.  EPICARDIAL WIRES: No.  TIE DOWNS: Yes  BRICE: No.    Physical Exam  CONSTITUTIONAL: Well appearing in NAD assessed laying comfortably in bed   NEURO: A&OX3. No focal deficits noted, moving bilateral upper and lower extremities                    CV: RRR, no murmurs, rubs, gallops  RESPIRATORY: Clear to auscultation bilateral posterior lung fields, no wheezes, rales, rhonchi   GI: +BS, NT/ND  MUSKULOSKELETAL: No peripheral edema or calf tenderness. Full strength and ROM bilateral upper and lower extremities   VASCULAR: Bilateral distal pulses 2+  INCISIONS: L mini thoracotomy clean, dry, intact. L groin with small hematoma, non-tender     LABS:                        16.7   6.76  )-----------( 140      ( 05 Oct 2020 06:52 )             49.5       COUMADIN:  No    PT/INR - ( 05 Oct 2020 06:52 )   PT: 12.6 sec;   INR: 1.05          PTT - ( 05 Oct 2020 06:52 )  PTT:29.5 sec    10-05    141  |  101  |  18  ----------------------------<  137<H>  4.4   |  26  |  0.83    Ca    9.3      05 Oct 2020 06:52  Mg     2.2     10-05    MEDICATIONS  (STANDING):  aspirin enteric coated 81 milliGRAM(s) Oral daily  atorvastatin 40 milliGRAM(s) Oral at bedtime  clopidogrel Tablet 75 milliGRAM(s) Oral daily  heparin   Injectable 5000 Unit(s) SubCutaneous every 8 hours  levothyroxine 125 MICROGram(s) Oral daily  metoprolol tartrate 50 milliGRAM(s) Oral every 12 hours  pantoprazole    Tablet 40 milliGRAM(s) Oral before breakfast  polyethylene glycol 3350 17 Gram(s) Oral daily  senna 2 Tablet(s) Oral at bedtime  sodium chloride 0.9%. 500 milliLiter(s) (75 mL/Hr) IV Continuous <Continuous>    MEDICATIONS  (PRN):  acetaminophen   Tablet .. 650 milliGRAM(s) Oral every 6 hours PRN Mild Pain (1 - 3)  ketorolac   Injectable 15 milliGRAM(s) IV Push every 6 hours PRN Moderate Pain (4 - 6)  oxycodone    5 mG/acetaminophen 325 mG 1 Tablet(s) Oral every 6 hours PRN Moderate Pain (4 - 6)        RADIOLOGY & ADDITIONAL TESTS:  < from: Xray Chest 1 View- PORTABLE-Routine (Xray Chest 1 View- PORTABLE-Routine in AM.) (10.05.20 @ 03:18) >  EXAM:  XR CHEST PORTABLE ROUTINE 1V                          PROCEDURE DATE:  10/05/2020          INTERPRETATION:  Clinical history/reason for exam: Effusion.    Single frontal view.    COMPARISON: October 4, 2020.    Findings/  impression: Stable heart size. Right opacity, stable. Left opacity/pleural effusion, increased. Structures.    < end of copied text >    A/P:    69 y/o male with PMHx of HTN, HLD, thyroid cancer s/p thyroidectomy with XRT on 2018 with subsequent hypothyroidism (now on synthroid), h/o DVT R leg after flight to [facility] 2/2020 (on Xarelto). He presented for routine testing to his cardiologist and was found to have an abnormal stress test. He was referred for cardiac catheterization and had 3VCAD. CT surgery (Dr. Clark) consulted for hybrid procedure and the patient was deemed a good candidate. On 9/30/20 he underwent an uncomplicated MIDCAB (SAEED-LAD) with Dr. Clark. Post operatively he was brought to the CTICU extubated in stable condition. POD1 his chest tube was removed and he was transitioned to floor care. POD2 his donnell was removed. He remained stable on 9la POD3-4 and today, on POD5, he underwent a PCI (AISLINN to dLCx).     Neurovascular:   - No delirium. Pain well controlled with current regimen.  -Continue tylenol, percocet PRN    Cardiovascular:   - POD5 S/p MIDCAB (LIMA-LAD), EF 60%  - Today had AISLINN to dLCx, uncomplicated   -Hemodynamically stable. HR controlled (75-95)  - Hx of HTN, BP controlled (123//76), continue metoprolol 50 q12  - CAD: continue ASA, plavix, atorvastatin     Respiratory:   - 02 Sat = 94% on RA.  -Encourage C+DB and Use of IS 10x / hr while awake.  - Will get AM PA/Lat prior to discharge     GI:   - Stable.  -Continue GI PPX with protonix  -PO Diet.    Renal / :  - BUN/Cr stable at 18/0.83  -Continue to monitor renal function.  -Monitor I/O's.  - Replete electrolytes PRN    Endocrine:    -A1c 6.2, no known hx diabetes, continue MISS and monitor FS while inpatient  -TSH 0.303, known hx hypothyroidism (s/p thyroidectomy), continue levothyroxine 125 mcg    Hematologic:  -H/H stable at 16.7/49.5 with no obvious signs of bleeding  -Coagulation Panel.  - Was on xarelto for DVT pre-op, has had negative dopplers recent, per Dr. Clark not resuming NOAC on discharge     ID:  -Pt remains afebrile with no elevation in WBC or signs of infection  -Continue to monitor CBC  -Observe for SIRS/Sepsis Syndrome.    Prophylaxis:  -DVT prophylaxis with 5000 SubQ Heparin q8h.  -SCD's    Disposition:  -Home tomorrow AM

## 2020-10-05 NOTE — PROGRESS NOTE ADULT - SUBJECTIVE AND OBJECTIVE BOX
Interventional Cardiology PA Precath Note      HPI: 71 y/o male, SHELLFISH ALLERGY (Angioedema) with a PMHx of HTN, HLD, thyroid cancer s/p thyroidectomy with XRT on 2018 with subsequent hypothyroidism (now on Synthroid), h/o DVT R leg after flight to [facility] 2/2020 (on Xarelto @ home, pt reports having a negative duplex US prior to admission) initially presented to Cardiologist Dr. Ryan Coughlin for with the complain of CP/HDZ, resolved with rest (per documentation in outpatient MD office note). NST 8/27/20: small area of anterior wall ischemia, LVEF 62%. ECHO per MD note: LVEF 57%, LVH, mild MR. Patient was subsequently referred for cardiac catheterization revealing 3VCAD: 9/29/2020 revealing LM normal, mLAD 99% stenosis, diffuse disease, D1 mild luminal irregularities, pLAD 40% stenosis, dLCx 100% stenosis fills via L-L collaterals, OM1 Mild luminal irregularities, prox Om2 50% stenosis, mRCA 40% stenosis, pRPLS 70% stenosis, LVEF 55%, LVEDP 12 mmHG.  CTS Dr. Clark was consulted underwent uneventful MIDCAB 9/30/2020. Patient now presents for staged PCI of residual disease. Patient was loaded with Plavix 600 mg x one dose on 9/29/2020 which was subsequently discontinued for OR 9/30/2020, Plavix 75 mg daily was restarted on 9/30/2020. Patient noted to have a drop in platelet count with lowest on 10/2/2020 (99K). Current platelet count 140K. Patient given additional Plavix 300 mg x one dose as discussed with Dr. Coughlin. In addition, for severe shellfish allergy, patient received Solucortef 200 mg IV x one dose prior to procedure and Benadryl 50 mg IV on call the cath lab ordered. Consent obtained and placed in chart.       PMH:  As above  PSH:  History of skin surgery s/p excision of cancerous lesion on back under  general anesthesia in 2016, s/p MID cab 9/2020, S/P thyroid surgery 2018.   ALL: SHELLFISH ANGIOEDEMA  SocHX: Occasional ETOH use, denies drug or tobacco use  FHx: Non contributory  MEDS:   acetaminophen   Tablet .. 650 milliGRAM(s) Oral every 6 hours PRN  aspirin enteric coated 81 milliGRAM(s) Oral daily  atorvastatin 40 milliGRAM(s) Oral at bedtime  clopidogrel Tablet 75 milliGRAM(s) Oral daily  clopidogrel Tablet 300 milliGRAM(s) Oral once  diphenhydrAMINE   Injectable 50 milliGRAM(s) IV Push once  heparin   Injectable 5000 Unit(s) SubCutaneous every 8 hours  ketorolac   Injectable 15 milliGRAM(s) IV Push every 6 hours PRN  levothyroxine 125 MICROGram(s) Oral daily  metoprolol tartrate 50 milliGRAM(s) Oral every 12 hours  oxycodone    5 mG/acetaminophen 325 mG 1 Tablet(s) Oral every 6 hours PRN  pantoprazole    Tablet 40 milliGRAM(s) Oral before breakfast  polyethylene glycol 3350 17 Gram(s) Oral daily  senna 2 Tablet(s) Oral at bedtime    T(C): 36.2 (10-05-20 @ 05:22), Max: 37.4 (10-04-20 @ 21:24)  HR: 80 (10-05-20 @ 07:08) (75 - 95)  BP: 121/80 (10-05-20 @ 07:08) (121/80 - 147/76)  RR: 16 (10-05-20 @ 07:08) (16 - 18)  SpO2: 94% (10-05-20 @ 07:08) (93% - 96%)  Wt(kg): --      Gen: NAD, resting comfortable in bed  Neck: No JVD b/l, Dressing Right side: C/D/I  Cardiac: +S1, S2, RRR, Left anterior chest wall dressing: C/D/I x two  Pulm: CTA /bl  Abdomen: BS present, soft, NT, ND  Extremities: No C/C/E b/l. Right groin: extremely tender to touch, no bruit, no hematoma, no bruit    PULSES:	B	    R	      FEM         	                    DP/ PT  Right		  2+            2+   No bruit                     2+/1+ b/l             Left		 2+             2+    No bruit                    2+/1+ b/l                                          16.7   6.76  )-----------( 140      ( 05 Oct 2020 06:52 )             49.5     10-05    141  |  101  |  18  ----------------------------<  137<H>  4.4   |  26  |  0.83    Ca    9.3      05 Oct 2020 06:52  Mg     2.2     10-05            EKG: Sinus Rhythm @ 75 BPM, TWI III (unable to perform EKG prior to procedure and patient brought to room).   			  ASA II			Mallampati class: II            Anginal Class: II-II  A/P:     71 y/o male, SHELLFISH ALLERGY (Angioedema) with a PMHx of HTN, HLD, thyroid cancer s/p thyroidectomy with XRT on 2018 with subsequent hypothyroidism (now on Synthroid), h/o DVT R leg after flight to [facility] 2/2020 (on Xarelto @ home, pt reports having a negative duplex US prior to admission) presented to [facility] 9/29/2020 for cardiac catheterization in setting of abnormal NST and CCS Angina Class III symptoms. Cardiac Catheterization 9/29/2020 revealed 3VCAD and is now s/p uneventful midCAB 9/30/2020. Patient now presents for staged PCI of residual blockages.     -EF normal, appears euvolemic on exam. NS @ 75 cc/hour ordered for pre cath fluids  -Patient with a known severe shellfish allergy (angioedema); did receive pre medication prior to cardiac catheterization 9/29/2020. Solucortef 200 mg IV x one dose and Bendaryl 50 mg IV on call to the cath lab ordered.  -Coags WNL this AM. Patient noted to have drop in platelet count during admission: Platelet count 127K on admission (9/29/2020), dropped as low as 87K (10/3/2020). Patient was received Plavix 600 mg x one dose prior to diagnostic cardiac catheterization 9/29/2020 (held for OR 9/30/2020) and resumed Plavix 75 mg daily 10/1/2020. Patient loaded with additional Plavix 300 mg x one dose as discussed with Dr. Coughlin.   -Patient is suitable candidate for moderate sedation  -Consent placed in chart.       Sedation Plan:     Moderate      Patient Is Suitable Candidate For Sedation?      Yes     Risks & benefits of procedure and sedation and risks and benefits for the alternative therapy have been explained to the patient in layman’s terms including but not limited to: allergic reaction, bleeding, infection, arrhythmia, respiratory compromise, renal and vascular compromise, limb damage, MI, CVA, emergent CABG/Vascular Surgery and death. Informed consent obtained and in chart.

## 2020-10-06 ENCOUNTER — TRANSCRIPTION ENCOUNTER (OUTPATIENT)
Age: 70
End: 2020-10-06

## 2020-10-06 VITALS
RESPIRATION RATE: 18 BRPM | OXYGEN SATURATION: 99 % | HEART RATE: 74 BPM | DIASTOLIC BLOOD PRESSURE: 70 MMHG | SYSTOLIC BLOOD PRESSURE: 131 MMHG

## 2020-10-06 LAB
ALBUMIN SERPL ELPH-MCNC: 2.5 G/DL — LOW (ref 3.3–5)
ALP SERPL-CCNC: 53 U/L — SIGNIFICANT CHANGE UP (ref 40–120)
ALT FLD-CCNC: 36 U/L — SIGNIFICANT CHANGE UP (ref 10–45)
ANION GAP SERPL CALC-SCNC: 11 MMOL/L — SIGNIFICANT CHANGE UP (ref 5–17)
AST SERPL-CCNC: 33 U/L — SIGNIFICANT CHANGE UP (ref 10–40)
BILIRUB SERPL-MCNC: 0.3 MG/DL — SIGNIFICANT CHANGE UP (ref 0.2–1.2)
BUN SERPL-MCNC: 31 MG/DL — HIGH (ref 7–23)
CALCIUM SERPL-MCNC: 8.5 MG/DL — SIGNIFICANT CHANGE UP (ref 8.4–10.5)
CHLORIDE SERPL-SCNC: 105 MMOL/L — SIGNIFICANT CHANGE UP (ref 96–108)
CO2 SERPL-SCNC: 23 MMOL/L — SIGNIFICANT CHANGE UP (ref 22–31)
CREAT SERPL-MCNC: 0.96 MG/DL — SIGNIFICANT CHANGE UP (ref 0.5–1.3)
GLUCOSE SERPL-MCNC: 158 MG/DL — HIGH (ref 70–99)
HCT VFR BLD CALC: 39.5 % — SIGNIFICANT CHANGE UP (ref 39–50)
HGB BLD-MCNC: 13.7 G/DL — SIGNIFICANT CHANGE UP (ref 13–17)
MAGNESIUM SERPL-MCNC: 2.1 MG/DL — SIGNIFICANT CHANGE UP (ref 1.6–2.6)
MCHC RBC-ENTMCNC: 33.8 PG — SIGNIFICANT CHANGE UP (ref 27–34)
MCHC RBC-ENTMCNC: 34.7 GM/DL — SIGNIFICANT CHANGE UP (ref 32–36)
MCV RBC AUTO: 97.5 FL — SIGNIFICANT CHANGE UP (ref 80–100)
NRBC # BLD: 0 /100 WBCS — SIGNIFICANT CHANGE UP (ref 0–0)
PLATELET # BLD AUTO: 148 K/UL — LOW (ref 150–400)
POTASSIUM SERPL-MCNC: 3.8 MMOL/L — SIGNIFICANT CHANGE UP (ref 3.5–5.3)
POTASSIUM SERPL-SCNC: 3.8 MMOL/L — SIGNIFICANT CHANGE UP (ref 3.5–5.3)
PROT SERPL-MCNC: 5.7 G/DL — LOW (ref 6–8.3)
RBC # BLD: 4.05 M/UL — LOW (ref 4.2–5.8)
RBC # FLD: 13 % — SIGNIFICANT CHANGE UP (ref 10.3–14.5)
SODIUM SERPL-SCNC: 139 MMOL/L — SIGNIFICANT CHANGE UP (ref 135–145)
WBC # BLD: 7.85 K/UL — SIGNIFICANT CHANGE UP (ref 3.8–10.5)
WBC # FLD AUTO: 7.85 K/UL — SIGNIFICANT CHANGE UP (ref 3.8–10.5)

## 2020-10-06 PROCEDURE — 71046 X-RAY EXAM CHEST 2 VIEWS: CPT | Mod: 26

## 2020-10-06 RX ORDER — ACETAMINOPHEN 500 MG
2 TABLET ORAL
Qty: 240 | Refills: 0
Start: 2020-10-06 | End: 2020-11-04

## 2020-10-06 RX ORDER — LEVOTHYROXINE SODIUM 125 MCG
1 TABLET ORAL
Qty: 0 | Refills: 0 | DISCHARGE

## 2020-10-06 RX ORDER — ATORVASTATIN CALCIUM 80 MG/1
1 TABLET, FILM COATED ORAL
Qty: 0 | Refills: 0 | DISCHARGE

## 2020-10-06 RX ORDER — METOPROLOL TARTRATE 50 MG
1 TABLET ORAL
Qty: 60 | Refills: 0
Start: 2020-10-06 | End: 2020-11-04

## 2020-10-06 RX ORDER — LEVOTHYROXINE SODIUM 125 MCG
1 TABLET ORAL
Qty: 30 | Refills: 0
Start: 2020-10-06 | End: 2020-11-04

## 2020-10-06 RX ORDER — AMLODIPINE BESYLATE 2.5 MG/1
1 TABLET ORAL
Qty: 0 | Refills: 0 | DISCHARGE

## 2020-10-06 RX ORDER — CLOPIDOGREL BISULFATE 75 MG/1
1 TABLET, FILM COATED ORAL
Qty: 30 | Refills: 0
Start: 2020-10-06 | End: 2020-11-04

## 2020-10-06 RX ORDER — FONDAPARINUX SODIUM 2.5 MG/.5ML
1 INJECTION, SOLUTION SUBCUTANEOUS
Qty: 0 | Refills: 0 | DISCHARGE

## 2020-10-06 RX ORDER — ATORVASTATIN CALCIUM 80 MG/1
1 TABLET, FILM COATED ORAL
Qty: 30 | Refills: 0
Start: 2020-10-06 | End: 2020-11-04

## 2020-10-06 RX ORDER — SENNA PLUS 8.6 MG/1
2 TABLET ORAL
Qty: 60 | Refills: 0
Start: 2020-10-06 | End: 2020-11-04

## 2020-10-06 RX ORDER — ASPIRIN/CALCIUM CARB/MAGNESIUM 324 MG
1 TABLET ORAL
Qty: 30 | Refills: 0
Start: 2020-10-06 | End: 2020-11-04

## 2020-10-06 RX ORDER — OXYCODONE AND ACETAMINOPHEN 5; 325 MG/1; MG/1
1 TABLET ORAL
Qty: 20 | Refills: 0
Start: 2020-10-06 | End: 2020-10-10

## 2020-10-06 RX ORDER — PANTOPRAZOLE SODIUM 20 MG/1
1 TABLET, DELAYED RELEASE ORAL
Qty: 30 | Refills: 0
Start: 2020-10-06 | End: 2020-11-04

## 2020-10-06 RX ADMIN — CLOPIDOGREL BISULFATE 75 MILLIGRAM(S): 75 TABLET, FILM COATED ORAL at 10:51

## 2020-10-06 RX ADMIN — HEPARIN SODIUM 5000 UNIT(S): 5000 INJECTION INTRAVENOUS; SUBCUTANEOUS at 05:48

## 2020-10-06 RX ADMIN — Medication 125 MICROGRAM(S): at 05:47

## 2020-10-06 RX ADMIN — Medication 50 MILLIGRAM(S): at 09:08

## 2020-10-06 RX ADMIN — OXYCODONE AND ACETAMINOPHEN 1 TABLET(S): 5; 325 TABLET ORAL at 04:13

## 2020-10-06 RX ADMIN — PANTOPRAZOLE SODIUM 40 MILLIGRAM(S): 20 TABLET, DELAYED RELEASE ORAL at 05:48

## 2020-10-06 RX ADMIN — OXYCODONE AND ACETAMINOPHEN 1 TABLET(S): 5; 325 TABLET ORAL at 03:24

## 2020-10-06 RX ADMIN — Medication 81 MILLIGRAM(S): at 10:51

## 2020-10-06 NOTE — PROGRESS NOTE ADULT - REASON FOR ADMISSION
cardiac catheterization with possible intervention
s/p midCAB

## 2020-10-06 NOTE — PROGRESS NOTE ADULT - ASSESSMENT
69 yo M with PMHx of HTN, HLD, thyroid cancer s/p thyroidectomy with XRT on 2018 with subsequent hypothyroidism (now on synthroid), h/o DVT R leg after flight to [facility] 2/2020 (on Xarelto with plan to complete 9 month course, last dose Thursday 9/24/2020; pt reports having a negative duplex US about 3wks ago), s/p cancerous cyst removal from back under general anesthesia in 2016 who presented to Cardiologist Dr. Ryan Coughlin with c/o CP/HDZ on exertion, resolved with rest; however, denies CP/SOB to this provider.  Pt states he was recently dx with having a cancerous cyst on stomach, has appointment with surgeon on Monday 9/28 who will then set date for surgery under general anesthesia. Denies dizziness, diaphoresis, palpitations, fatigue, LE edema, orthopnea, PND, syncope, N/V, abdominal pain. NST 8/27/20: small area of anterior wall ischemia, LVEF 62%. ECHO per MD note: LVEF 57%, LVH, mild MR.  His complete work up has revealed coronary artery disease.  On 9/30/20, patient was taken to the operation room and he underwent MICAB performed by Dr. Clark.  Patient tolerated t he procedures well.  For further details, please refer the operative reports.  Postoperatively, patient continued his post op care in the cardiac unit.  Overnight, he has weaned off his ventilation and extubated on POD#1.  On POD#1, patient was afebrile and ambulated to chair.  He was stepped down for further care.  On POD#2, patient's blakes was removed.  A follow up CXR was performed; there was no pneumothorax or effusion was appreciated.  Patient continue to do well.  He has tolerated the titration of beta blocker along with ASA/plavix and statin.  On POD#5, patient underwent PCI placement.  Patient tolerated the process well.  For further details, please refer the separate operative reports.  On POD#6, patient is  hemodynamically stable.  He tolerated regular diet and ambulated without difficulty.  He is discharge home for further care,.     35 minutes was spent with the patient reviewing the discharge material including medications, follow up appointments, recovery, concerning symptoms, and how to contact their health care providers if they have questions     Med Reconciliation:  Medication Reconciliation Status	Admission Reconciliation is Completed  Discharge Reconciliation is Completed  Discharge Medications	Actamin 325 mg oral tablet: 2 tab(s) orally every 6 hours, As needed, Mild Pain (1 - 3) MDD:8 tabs  Adult Aspirin Regimen 81 mg oral delayed release tablet: 1 tab(s) orally once a day  atorvastatin 20 mg oral tablet: 1 tab(s) orally once a day  Innerclean oral tablet: 2 tab(s) orally once a day (at bedtime)  Lopressor 50 mg oral tablet: 1 tab(s) orally every 12 hours  Percocet 5 mg-325 mg oral tablet: 1 tab(s) orally every 6 hours, As needed, Moderate Pain (4 - 6) MDD:4  Plavix 75 mg oral tablet: 1 tab(s) orally once a day  Protonix 40 mg oral delayed release tablet: 1 tab(s) orally once a day (before a meal)  Synthroid 125 mcg (0.125 mg) oral tablet: 1 tab(s) orally once a day  ,  ,     Care Plan/Procedures:  Goal(s)	To get better and follow your care plan as instructed.  Discharge Diagnoses, Assessment and Plan of Treatment	PRINCIPAL DISCHARGE DIAGNOSIS  Diagnosis: 3-vessel CAD  Assessment and Plan of Treatment:  Discharge Procedures, Findings and Treatment	PRINCIPAL PROCEDURE  Procedure: CABG, using 1 arterial graft  Findings and Treatment: minimally invasive   Hybrid procedure PCI     Follow Up:  Care Providers for Follow up (PCP/Outpatient Provider)	Christopher Clark  CARDIOVASCULAR SURGERY  [address], [address] [place] [address]  Phone: [phone]  Fax: [phone]  Scheduled Appointment: 10/13/2020 12:45 PM    Joe Coughlin  CARDIOVASCULAR DISEASE  [address], [address] [place] [address]  Phone: [phone]  Fax: [phone]  Follow Up Time:  	  Patient's Scheduled Appointments	BIRD ANTONY ; 10/13/2020 ; NPP CT Surg [address]   Additional Scheduled Appointments	-Please follow up with Dr. Clark on 10/13/2020 at 12:45PM  The office is located at Health system, [facility]. Call us with any questions #[phone].    -The office will call you with follow up with Dr. Coughlin within 1-2 weeks. Dr. Coughlin's office number is [phone]    -Walk daily as tolerated and use your incentive spirometer every hour.    -No driving or strenuous activity/exercise for 6 weeks, or until cleared by your surgeon.    -Gently clean your incisions with anti-bacterial soap and water, pat dry.  You may leave them open to air.    -Call your doctor if you have shortness of breath, chest pain not relieved by pain medication, dizziness, fever >101.5, or increased redness or drainage from incisions.

## 2020-10-06 NOTE — DISCHARGE NOTE PROVIDER - NSDCFUADDAPPT_GEN_ALL_CORE_FT
-Please follow up with Dr. Clark,___.  The office is located at [facility], 4th floor. Call us with any questions #[phone].    -Walk daily as tolerated and use your incentive spirometer every hour.    -No driving or strenuous activity/exercise for 6 weeks, or until cleared by your surgeon.    -Gently clean your incisions with anti-bacterial soap and water, pat dry.  You may leave them open to air.    -Call your doctor if you have shortness of breath, chest pain not relieved by pain medication, dizziness, fever >101.5, or increased redness or drainage from incisions.   -Please follow up with Dr. Clark on 10/13/2020 at 12:45PM  The office is located at [facility], 4th floor. Call us with any questions #[phone].    -The office will call you with follow up with Dr. Coughlin within 1-2 weeks. Dr. Coughlin's office number is [phone]    -Walk daily as tolerated and use your incentive spirometer every hour.    -No driving or strenuous activity/exercise for 6 weeks, or until cleared by your surgeon.    -Gently clean your incisions with anti-bacterial soap and water, pat dry.  You may leave them open to air.    -Call your doctor if you have shortness of breath, chest pain not relieved by pain medication, dizziness, fever >101.5, or increased redness or drainage from incisions.

## 2020-10-06 NOTE — PROGRESS NOTE ADULT - SUBJECTIVE AND OBJECTIVE BOX
Patient discussed on morning rounds with Dr. Clark     Operation / Date: Robotic MIDCAB ; 9/30/2020    Surgeon: Dr. Clark     Referring Physician: Dr. Joe Coughlin    SUBJECTIVE ASSESSMENT AND HOSPITAL COURSE:  70y Male seen on rounds this morning. He states he feels well and would like to go home. He denies fever, chills, CP, SOB, HA, dizziness. He has been tolerating diet and ambulating.       Vital Signs Last 24 Hrs  T(C): 36.6 (06 Oct 2020 05:01), Max: 36.8 (05 Oct 2020 21:55)  T(F): 97.8 (06 Oct 2020 05:01), Max: 98.2 (05 Oct 2020 21:55)  HR: 74 (06 Oct 2020 09:07) (66 - 99)  BP: 131/70 (06 Oct 2020 09:07) (106/69 - 152/68)  BP(mean): 113 (05 Oct 2020 15:41) (82 - 113)  RR: 18 (06 Oct 2020 09:07) (16 - 18)  SpO2: 99% (06 Oct 2020 09:07) (93% - 99%)    EPICARDIAL WIRES REMOVED: Yes  TIE DOWNS REMOVED: Yes    PHYSICAL EXAM:  Gen: NAD, sitting up in chair, non-toxic appearing   Neuro: AOx3, following commands   Cardiac: RRR, S1S2, no murmur noted   Resp: CTAB, no wheeze noted   Abd: Soft, NT, ND, +BS  Ext: WWP, no LE edema noted b/l   Vascular: Pulses present and equal throughout   Incisions: Left anterior thoracotomy incision c/d/i, no erythema/drainage noted     LABS:                        13.7   7.85  )-----------( 148      ( 06 Oct 2020 05:45 )             39.5       COUMADIN:  No    PT/INR - ( 05 Oct 2020 06:52 )   PT: 12.6 sec;   INR: 1.05       PTT - ( 05 Oct 2020 06:52 )  PTT:29.5 sec    10-06    139  |  105  |  31<H>  ----------------------------<  158<H>  3.8   |  23  |  0.96    Ca    8.5      06 Oct 2020 05:45  Mg     2.1     10-06    TPro  5.7<L>  /  Alb  2.5<L>  /  TBili  0.3  /  DBili  x   /  AST  33  /  ALT  36  /  AlkPhos  53  10-06      Discharge CXR:  < from: Xray Chest 2 Views PA/Lat (10.06.20 @ 08:58) >  PA and lateral views of the chest are compared to the prior study dated 10/5/2010. Normal heart size. Small left pleural effusion and left basilar atelectasis as seen on prior study. Mild linear atelectasis right lung base. No pneumothorax.      IMPRESSION: Small left pleural effusion and left basilar atelectasis.    < end of copied text >

## 2020-10-06 NOTE — DISCHARGE NOTE PROVIDER - HOSPITAL COURSE
69 yo M with PMHx of HTN, HLD, thyroid cancer s/p thyroidectomy with XRT on 2018 with subsequent hypothyroidism (now on synthroid), h/o DVT R leg after flight to [facility] 2/2020 (on Xarelto with plan to complete 9 month course, last dose Thursday 9/24/2020; pt reports having a negative duplex US about 3wks ago), s/p cancerous cyst removal from back under general anesthesia in 2016 who presented to Cardiologist Dr. Ryan Coughlin with c/o CP/HDZ on exertion, resolved with rest; however, denies CP/SOB to this provider.  Pt states he was recently dx with having a cancerous cyst on stomach, has appointment with surgeon on Monday 9/28 who will then set date for surgery under general anesthesia. Denies dizziness, diaphoresis, palpitations, fatigue, LE edema, orthopnea, PND, syncope, N/V, abdominal pain. NST 8/27/20: small area of anterior wall ischemia, LVEF 62%. ECHO per MD note: LVEF 57%, LVH, mild MR.  His complete work up has revealed coronary artery disease.  On 9/30/20, patient was taken to the operation room and he underwent MICAB performed by Dr. Clark.  Patient tolerated t he procedures well.  For further details, please refer the operative reports.  Postoperatively, patient continued his post op care in the cardiac unit.  Overnight, he has weaned off his ventilation and extubated on POD#1.  On POD#1, patient was afebrile and ambulated to chair.  He was stepped down for further care.  On POD#2, patient's blakes was removed.  A follow up CXR was performed; there was no pneumothorax or effusion was appreciated.  Patient continue to do well.  He has tolerated the titration of beta blocker along with ASA/plavix and statin.  On POD#5, patient underwent PCI placement.  Patient tolerated the process well.  For further details, please refer the separate operative reports.  On POD#6, patient is  hemodynamically stable.  He tolerated regular diet and ambulated without difficulty.  He is discharge home for further care,      35 minutes was spent with the patient reviewing the discharge material including medications, follow up appointments, recovery, concerning symptoms, and how to contact their health care providers if they have questions 71 yo M with PMHx of HTN, HLD, thyroid cancer s/p thyroidectomy with XRT on 2018 with subsequent hypothyroidism (now on synthroid), h/o DVT R leg after flight to [facility] 2/2020 (on Xarelto with plan to complete 9 month course, last dose Thursday 9/24/2020; pt reports having a negative duplex US about 3wks ago), s/p cancerous cyst removal from back under general anesthesia in 2016 who presented to Cardiologist Dr. Ryan Coughlin with c/o CP/HDZ on exertion, resolved with rest; however, denies CP/SOB to this provider.  Pt states he was recently dx with having a cancerous cyst on stomach, has appointment with surgeon on Monday 9/28 who will then set date for surgery under general anesthesia. Denies dizziness, diaphoresis, palpitations, fatigue, LE edema, orthopnea, PND, syncope, N/V, abdominal pain. NST 8/27/20: small area of anterior wall ischemia, LVEF 62%. ECHO per MD note: LVEF 57%, LVH, mild MR.  His complete work up has revealed coronary artery disease.  On 9/30/20, patient was taken to the operation room and he underwent MICAB performed by Dr. Clark.  Patient tolerated t he procedures well.  For further details, please refer the operative reports.  Postoperatively, patient continued his post op care in the cardiac unit.  Overnight, he has weaned off his ventilation and extubated on POD#1.  On POD#1, patient was afebrile and ambulated to chair.  He was stepped down for further care.  On POD#2, patient's blakes was removed.  A follow up CXR was performed; there was no pneumothorax or effusion was appreciated.  Patient continue to do well.  He has tolerated the titration of beta blocker along with ASA/plavix and statin.  On POD#5, patient underwent PCI placement.  Patient tolerated the process well.  For further details, please refer the separate operative reports.  On POD#6, patient is  hemodynamically stable.  He tolerated regular diet and ambulated without difficulty.  He is discharge home for further care,.     35 minutes was spent with the patient reviewing the discharge material including medications, follow up appointments, recovery, concerning symptoms, and how to contact their health care providers if they have questions

## 2020-10-06 NOTE — DISCHARGE NOTE PROVIDER - CARE PROVIDER_API CALL
Christopher Clark  CARDIOVASCULAR SURGERY  [address] [place] [address]  Phone: [phone]  Fax: [phone]  Follow Up Time:    Christopher Clark  CARDIOVASCULAR SURGERY  [address], [address] [place] [address]  Phone: [phone]  Fax: [phone]  Scheduled Appointment: 10/13/2020 12:45 PM    Joe Coughlin  CARDIOVASCULAR DISEASE  [address], [address] [place] [address]  Phone: [phone]  Fax: [phone]  Follow Up Time:

## 2020-10-06 NOTE — DISCHARGE NOTE PROVIDER - NSDCCPTREATMENT_GEN_ALL_CORE_FT
PRINCIPAL PROCEDURE  Procedure: CABG, using 1 arterial graft  Findings and Treatment: minimally invasive   Hybrid procedure PCI

## 2020-10-06 NOTE — DISCHARGE NOTE PROVIDER - NSDCMRMEDTOKEN_GEN_ALL_CORE_FT
atorvastatin 20 mg oral tablet: 1 tab(s) orally once a day  Norvasc 5 mg oral tablet: 1 tab(s) orally once a day  Synthroid 125 mcg (0.125 mg) oral tablet: 1 tab(s) orally once a day  Xarelto 15 mg oral tablet: 1 tab(s) orally once a day (in the evening)   Actamin 325 mg oral tablet: 2 tab(s) orally every 6 hours, As needed, Mild Pain (1 - 3) MDD:8 tabs  Adult Aspirin Regimen 81 mg oral delayed release tablet: 1 tab(s) orally once a day  atorvastatin 20 mg oral tablet: 1 tab(s) orally once a day  Innerclean oral tablet: 2 tab(s) orally once a day (at bedtime)  Lopressor 50 mg oral tablet: 1 tab(s) orally every 12 hours  Percocet 5 mg-325 mg oral tablet: 1 tab(s) orally every 6 hours, As needed, Moderate Pain (4 - 6) MDD:4  Plavix 75 mg oral tablet: 1 tab(s) orally once a day  Protonix 40 mg oral delayed release tablet: 1 tab(s) orally once a day (before a meal)  Synthroid 125 mcg (0.125 mg) oral tablet: 1 tab(s) orally once a day

## 2020-10-06 NOTE — DISCHARGE NOTE NURSING/CASE MANAGEMENT/SOCIAL WORK - PATIENT PORTAL LINK FT
You can access the FollowMyHealth Patient Portal offered by [facility] by registering at the following website: [email or web address]. By joining [employer]’s FollowMyHealth portal, you will also be able to view your health information using other applications (apps) compatible with our system.

## 2020-10-06 NOTE — PROGRESS NOTE ADULT - PROVIDER SPECIALTY LIST ADULT
CT Surgery
Critical Care
Intervent Cardiology
Intervent Cardiology

## 2020-10-06 NOTE — DISCHARGE NOTE PROVIDER - CARE PROVIDERS DIRECT ADDRESSES
,[email or web address]

## 2020-10-06 NOTE — DISCHARGE NOTE PROVIDER - PROVIDER TOKENS
PROVIDER:[TOKEN:[[id number]]] PROVIDER:[TOKEN:[[id number]],SCHEDULEDAPPT:[10/13/2020],SCHEDULEDAPPTTIME:[12:45 PM]],PROVIDER:[TOKEN:[[id number]]]

## 2020-10-08 ENCOUNTER — APPOINTMENT (OUTPATIENT)
Dept: CARE COORDINATION | Facility: HOME HEALTH | Age: 70
End: 2020-10-08
Payer: MEDICARE

## 2020-10-08 VITALS — HEIGHT: 61.02 IN | WEIGHT: 158.73 LBS | BODY MASS INDEX: 29.97 KG/M2

## 2020-10-08 DIAGNOSIS — I82.491 ACUTE EMBOLISM AND THROMBOSIS OF OTHER SPECIFIED DEEP VEIN OF RIGHT LOWER EXTREMITY: ICD-10-CM

## 2020-10-08 DIAGNOSIS — Z85.828 PERSONAL HISTORY OF OTHER MALIGNANT NEOPLASM OF SKIN: ICD-10-CM

## 2020-10-08 DIAGNOSIS — E89.0 POSTPROCEDURAL HYPOTHYROIDISM: ICD-10-CM

## 2020-10-08 DIAGNOSIS — Z95.1 PRESENCE OF AORTOCORONARY BYPASS GRAFT: ICD-10-CM

## 2020-10-08 DIAGNOSIS — Z92.3 PERSONAL HISTORY OF IRRADIATION: ICD-10-CM

## 2020-10-08 PROCEDURE — 99024 POSTOP FOLLOW-UP VISIT: CPT

## 2020-10-08 RX ORDER — PANTOPRAZOLE SODIUM 40 MG/1
40 TABLET, DELAYED RELEASE ORAL
Qty: 30 | Refills: 2 | Status: ACTIVE | COMMUNITY

## 2020-10-08 RX ORDER — ASPIRIN 81 MG
81 TABLET, DELAYED RELEASE (ENTERIC COATED) ORAL DAILY
Qty: 1 | Refills: 5 | Status: ACTIVE | COMMUNITY

## 2020-10-08 RX ORDER — ATORVASTATIN CALCIUM 20 MG/1
20 TABLET, FILM COATED ORAL
Refills: 2 | Status: ACTIVE | COMMUNITY

## 2020-10-08 RX ORDER — METOPROLOL TARTRATE 50 MG/1
50 TABLET ORAL
Refills: 0 | Status: ACTIVE | COMMUNITY

## 2020-10-08 RX ORDER — CLOPIDOGREL 75 MG/1
75 TABLET, FILM COATED ORAL DAILY
Qty: 30 | Refills: 0 | Status: ACTIVE | COMMUNITY

## 2020-10-08 RX ORDER — LEVOTHYROXINE SODIUM 125 UG/1
125 TABLET ORAL DAILY
Qty: 30 | Refills: 0 | Status: ACTIVE | COMMUNITY

## 2020-10-10 VITALS
BODY MASS INDEX: 23.49 KG/M2 | SYSTOLIC BLOOD PRESSURE: 116 MMHG | WEIGHT: 155 LBS | HEIGHT: 68 IN | TEMPERATURE: 98.1 F | OXYGEN SATURATION: 98 % | HEART RATE: 71 BPM | DIASTOLIC BLOOD PRESSURE: 71 MMHG

## 2020-10-10 PROBLEM — Z85.828 HISTORY OF MALIGNANT NEOPLASM OF SKIN: Status: RESOLVED | Noted: 2020-10-08 | Resolved: 2020-10-10

## 2020-10-10 PROBLEM — I82.491 DEEP VEIN THROMBOSIS (DVT) OF OTHER VEIN OF RIGHT LOWER EXTREMITY: Status: RESOLVED | Noted: 2020-10-08 | Resolved: 2020-10-10

## 2020-10-10 PROBLEM — Z92.3 H/O THERAPEUTIC RADIATION: Status: RESOLVED | Noted: 2020-10-08 | Resolved: 2020-10-10

## 2020-10-10 PROBLEM — E89.0 POSTOPERATIVE HYPOTHYROIDISM: Status: RESOLVED | Noted: 2020-10-08 | Resolved: 2020-10-10

## 2020-10-10 PROBLEM — Z95.1 S/P CABG X 1: Status: ACTIVE | Noted: 2020-09-30

## 2020-10-10 NOTE — PHYSICAL EXAM
[Sclera] : the sclera and conjunctiva were normal [Neck Appearance] : the appearance of the neck was normal [] : no respiratory distress [Apical Impulse] : the apical impulse was normal [Examination Of The Chest] : the chest was normal in appearance [Breast Appearance] : normal in appearance [Bowel Sounds] : normal bowel sounds [Abnormal Walk] : normal gait [Skin Color & Pigmentation] : normal skin color and pigmentation [Cranial Nerves] : cranial nerves 2-12 were intact

## 2020-10-11 NOTE — HISTORY OF PRESENT ILLNESS
[Dyslipidemia] : Dyslipidemia [Hypertension] : Hypertension [FreeTextEntry1] : HPI:  Mr. Huston is a 71 Y/O Male S/P a Robotic MIDCAB  on 9/30/2020 (LIMA-LAD), EF: 60% and a PCI Patient is being seen in home.\par \par PMHx: HTN, HLD, thyroid CA s/p thyroidecotmy on synthroid, DVT R leg (2/2020) on Xarelto for 9 month course p/w CP and HDZ, found to have 3VCAD\par \par . \par TCM COVID-19 screening protocol reviewed and questions answered prior to visit:\par 1Do you or your caregiver have a fever, cough or SOB? \par 2. Have you or your caregiver had contact with a suspect/known COVID-19 case within the last 14 days \par  3. Were you or your caregiver tested for COVID-19 in the last 7 days and awaiting result ? \par 4. Have you or your caregiver already had a positive result on a COVID-19 test ?\par  5. Do you live in a communal living environment, such as assisted living or SNF ? \par 6. Did you or your caregiver [place] 14 days?\par

## 2020-10-11 NOTE — REASON FOR VISIT
[Post Op: _________] : a [unfilled] post op visit [FreeTextEntry1] : CC:"This is my first visit after my surgery"

## 2020-10-11 NOTE — ASSESSMENT
[FreeTextEntry1] : Mr. Huston is a 71 Y/O male patient of Dr. Clark S/P Midcabg and a PCI Received patient in his home.  Patient appears generally well.   Denies CP, SOB, C/N/VD.  Patient is ambulating with out assistance and taking his medications as prescribed. \par Plan \par 1. Continue with Post op self care\par 2. Call CCC/CN with any issues or symptoms \par 3. Follow-up with surgeon, PCP, and cardiologist within in 14 days \par 4. weight daily at the same time with same amount of clothing on if a weight gain of 2lbs or above occurs in less than 24 hours or a weight gain of 3 lbs or more in a week\par 5. Use incentive spirometer 10 x a day 10 x and walk as much as tolerated\par 6. clean incision daily in the shower pat the area dry no creams or lotions for 8 weeks.\par

## 2020-10-13 ENCOUNTER — OUTPATIENT (OUTPATIENT)
Dept: OUTPATIENT SERVICES | Facility: HOSPITAL | Age: 70
LOS: 1 days | End: 2020-10-13
Payer: MEDICARE

## 2020-10-13 ENCOUNTER — TRANSCRIPTION ENCOUNTER (OUTPATIENT)
Age: 70
End: 2020-10-13

## 2020-10-13 ENCOUNTER — APPOINTMENT (OUTPATIENT)
Dept: CARDIOTHORACIC SURGERY | Facility: CLINIC | Age: 70
End: 2020-10-13
Payer: MEDICARE

## 2020-10-13 VITALS
DIASTOLIC BLOOD PRESSURE: 66 MMHG | OXYGEN SATURATION: 95 % | SYSTOLIC BLOOD PRESSURE: 123 MMHG | BODY MASS INDEX: 23.95 KG/M2 | RESPIRATION RATE: 17 BRPM | HEIGHT: 68 IN | HEART RATE: 73 BPM | TEMPERATURE: 96.8 F | WEIGHT: 158 LBS

## 2020-10-13 DIAGNOSIS — Z98.890 OTHER SPECIFIED POSTPROCEDURAL STATES: Chronic | ICD-10-CM

## 2020-10-13 PROCEDURE — 71046 X-RAY EXAM CHEST 2 VIEWS: CPT

## 2020-10-13 PROCEDURE — 71046 X-RAY EXAM CHEST 2 VIEWS: CPT | Mod: 26

## 2020-10-13 PROCEDURE — 99024 POSTOP FOLLOW-UP VISIT: CPT

## 2020-10-20 PROCEDURE — 84295 ASSAY OF SERUM SODIUM: CPT

## 2020-10-20 PROCEDURE — C1894: CPT

## 2020-10-20 PROCEDURE — 93306 TTE W/DOPPLER COMPLETE: CPT

## 2020-10-20 PROCEDURE — 36415 COLL VENOUS BLD VENIPUNCTURE: CPT

## 2020-10-20 PROCEDURE — 80061 LIPID PANEL: CPT

## 2020-10-20 PROCEDURE — 83735 ASSAY OF MAGNESIUM: CPT

## 2020-10-20 PROCEDURE — 93005 ELECTROCARDIOGRAM TRACING: CPT

## 2020-10-20 PROCEDURE — 84443 ASSAY THYROID STIM HORMONE: CPT

## 2020-10-20 PROCEDURE — 86900 BLOOD TYPING SEROLOGIC ABO: CPT

## 2020-10-20 PROCEDURE — 80053 COMPREHEN METABOLIC PANEL: CPT

## 2020-10-20 PROCEDURE — 80048 BASIC METABOLIC PNL TOTAL CA: CPT

## 2020-10-20 PROCEDURE — C1889: CPT

## 2020-10-20 PROCEDURE — 83880 ASSAY OF NATRIURETIC PEPTIDE: CPT

## 2020-10-20 PROCEDURE — 85027 COMPLETE CBC AUTOMATED: CPT

## 2020-10-20 PROCEDURE — C1760: CPT

## 2020-10-20 PROCEDURE — C1887: CPT

## 2020-10-20 PROCEDURE — 85730 THROMBOPLASTIN TIME PARTIAL: CPT

## 2020-10-20 PROCEDURE — 97161 PT EVAL LOW COMPLEX 20 MIN: CPT

## 2020-10-20 PROCEDURE — 81003 URINALYSIS AUTO W/O SCOPE: CPT

## 2020-10-20 PROCEDURE — 86803 HEPATITIS C AB TEST: CPT

## 2020-10-20 PROCEDURE — 82330 ASSAY OF CALCIUM: CPT

## 2020-10-20 PROCEDURE — 83605 ASSAY OF LACTIC ACID: CPT

## 2020-10-20 PROCEDURE — 71046 X-RAY EXAM CHEST 2 VIEWS: CPT

## 2020-10-20 PROCEDURE — 82803 BLOOD GASES ANY COMBINATION: CPT

## 2020-10-20 PROCEDURE — 82550 ASSAY OF CK (CPK): CPT

## 2020-10-20 PROCEDURE — 86850 RBC ANTIBODY SCREEN: CPT

## 2020-10-20 PROCEDURE — 94150 VITAL CAPACITY TEST: CPT

## 2020-10-20 PROCEDURE — C1769: CPT

## 2020-10-20 PROCEDURE — C1725: CPT

## 2020-10-20 PROCEDURE — 85025 COMPLETE CBC W/AUTO DIFF WBC: CPT

## 2020-10-20 PROCEDURE — 86923 COMPATIBILITY TEST ELECTRIC: CPT

## 2020-10-20 PROCEDURE — 82962 GLUCOSE BLOOD TEST: CPT

## 2020-10-20 PROCEDURE — C1874: CPT

## 2020-10-20 PROCEDURE — 83036 HEMOGLOBIN GLYCOSYLATED A1C: CPT

## 2020-10-20 PROCEDURE — 71045 X-RAY EXAM CHEST 1 VIEW: CPT

## 2020-10-20 PROCEDURE — 84132 ASSAY OF SERUM POTASSIUM: CPT

## 2020-10-20 PROCEDURE — 85610 PROTHROMBIN TIME: CPT

## 2020-10-20 PROCEDURE — S2900: CPT

## 2020-10-20 PROCEDURE — 86901 BLOOD TYPING SEROLOGIC RH(D): CPT

## 2020-10-20 PROCEDURE — 82553 CREATINE MB FRACTION: CPT

## 2020-10-20 PROCEDURE — 94002 VENT MGMT INPAT INIT DAY: CPT

## 2020-10-20 PROCEDURE — 84100 ASSAY OF PHOSPHORUS: CPT

## 2020-10-20 PROCEDURE — 86140 C-REACTIVE PROTEIN: CPT

## 2020-11-05 ENCOUNTER — TRANSCRIPTION ENCOUNTER (OUTPATIENT)
Age: 70
End: 2020-11-05

## 2021-04-13 ENCOUNTER — APPOINTMENT (OUTPATIENT)
Dept: CARDIOTHORACIC SURGERY | Facility: CLINIC | Age: 71
End: 2021-04-13
Payer: MEDICARE

## 2021-04-13 VITALS
WEIGHT: 162 LBS | DIASTOLIC BLOOD PRESSURE: 80 MMHG | RESPIRATION RATE: 18 BRPM | HEART RATE: 71 BPM | BODY MASS INDEX: 24.63 KG/M2 | OXYGEN SATURATION: 97 % | TEMPERATURE: 97.8 F | SYSTOLIC BLOOD PRESSURE: 166 MMHG

## 2021-04-13 PROCEDURE — 99212 OFFICE O/P EST SF 10 MIN: CPT

## 2021-04-13 PROCEDURE — 99072 ADDL SUPL MATRL&STAF TM PHE: CPT

## 2021-04-13 RX ORDER — OXYCODONE HYDROCHLORIDE AND ACETAMINOPHEN 5; 325 MG/1; MG/1
5-325 TABLET ORAL
Refills: 0 | Status: COMPLETED | COMMUNITY
End: 2021-04-13

## 2021-04-14 NOTE — HISTORY OF PRESENT ILLNESS
[FreeTextEntry1] : 69 yo male with PMH of HTN, HLD and CAD presents for 6 month follow up visit. He is status post Robotic assisted MIDCAB x 1 (LIMA->LAD) on 9/30/20 and s/p AISLINN-> distal LCX on 10/5/20 for completion of hybrid procedure.  He appears well, NAD.  He denies c/o chest pain, sob/rios, fever, lower extremity edema or palpitations. He has returned to full time work and all activities. He denies hospitalizations or additional cardiac procedures. CCS 1. \par

## 2021-04-14 NOTE — END OF VISIT
[Time Spent: ___ minutes] : I have spent [unfilled] minutes of time on the encounter. [FreeTextEntry3] : I, KENY FINNEY , am scribing for and in the presence of SERA DOMINGO the following sections: History of present illness, past Medical/family/surgical/family/social history, review of systems, vital signs, physical exam and disposition.\par I personally performed the services described in the documentation, reviewed the documentation recorded by the scribe in my presence and it accurately and completely records my words and actions.\par

## 2021-04-14 NOTE — PHYSICAL EXAM
[Sclera] : the sclera and conjunctiva were normal [PERRL With Normal Accommodation] : pupils were equal in size, round, and reactive to light [Extraocular Movements] : extraocular movements were intact [Neck Appearance] : the appearance of the neck was normal [Neck Cervical Mass (___cm)] : no neck mass was observed [Jugular Venous Distention Increased] : there was no jugular-venous distention [Thyroid Nodule] : there were no palpable thyroid nodules [Thyroid Diffuse Enlargement] : the thyroid was not enlarged [Auscultation Breath Sounds / Voice Sounds] : lungs were clear to auscultation bilaterally [Heart Rate And Rhythm] : heart rate was normal and rhythm regular [Heart Sounds] : normal S1 and S2 [Heart Sounds Gallop] : no gallops [Murmurs] : no murmurs [Heart Sounds Pericardial Friction Rub] : no pericardial rub [2+] : left 2+ [Bowel Sounds] : normal bowel sounds [Abdomen Soft] : soft [Abdomen Tenderness] : non-tender [Abdomen Mass (___ Cm)] : no abdominal mass palpated [No CVA Tenderness] : no ~M costovertebral angle tenderness [No Spinal Tenderness] : no spinal tenderness [Abnormal Walk] : normal gait [Nail Clubbing] : no clubbing  or cyanosis of the fingernails [Musculoskeletal - Swelling] : no joint swelling seen [Motor Tone] : muscle strength and tone were normal [Skin Color & Pigmentation] : normal skin color and pigmentation [Skin Turgor] : normal skin turgor [] : no rash [Deep Tendon Reflexes (DTR)] : deep tendon reflexes were 2+ and symmetric [Sensation] : the sensory exam was normal to light touch and pinprick [No Focal Deficits] : no focal deficits [Oriented To Time, Place, And Person] : oriented to person, place, and time [Impaired Insight] : insight and judgment were intact [Affect] : the affect was normal [FreeTextEntry1] : left ant mini thoracotomy well healed

## 2021-04-14 NOTE — CONSULT LETTER
[Dear  ___] : Dear  [unfilled], [Please see my note below.] : Please see my note below. [Sincerely,] : Sincerely, [FreeTextEntry2] : Ryan Coughlin M.D. [FreeTextEntry1] : BIRD ANTONY was seen today for a 6 month follow up visit. He is doing well status post Robotic assisted MIDCAB x 1 (LIMA->LAD) on Sept. 30,2020 and s/p AISLINN->LCX on 10/5/20 for completion of hybrid procedure. We have asked that the patient followup in your office. We have discharged him from our service today. Please contact our office for any questions or concerns at [phone].\par \par Thank you for allowing us to participate in this patients care. [FreeTextEntry3] : Christopher Clark MD, FRCS\par \par [facility] \par Department of Cardiothoracic  Surgery \par Director of Robotic Cardiac Surgery\par Professor of Cardiovascular & Thoracic Surgery\par [employer] \par \par TARAN HewittP\par

## 2021-04-14 NOTE — ASSESSMENT
[FreeTextEntry1] : 69 yo male status post Robotic assisted MIDCAB x 1 (LIMA->LAD) on 9/30/20 and s/p AISLINN-> distal LCX on 10/5/20 for completion of hybrid procedure presents for 6 month follow up visit.  \par \par Plan: \par continue current medications\par pt instructed to continue DAPT x 1 year s/t PCI\par follow up with Dr. Ryan Coughlin\par DC from CTS service

## 2022-05-10 ENCOUNTER — EMERGENCY (EMERGENCY)
Facility: HOSPITAL | Age: 72
LOS: 1 days | Discharge: HOME | End: 2022-05-10
Attending: STUDENT IN AN ORGANIZED HEALTH CARE EDUCATION/TRAINING PROGRAM | Admitting: STUDENT IN AN ORGANIZED HEALTH CARE EDUCATION/TRAINING PROGRAM
Payer: MEDICARE

## 2022-05-10 VITALS
RESPIRATION RATE: 18 BRPM | HEART RATE: 59 BPM | OXYGEN SATURATION: 100 % | DIASTOLIC BLOOD PRESSURE: 74 MMHG | SYSTOLIC BLOOD PRESSURE: 131 MMHG | TEMPERATURE: 99 F

## 2022-05-10 VITALS
TEMPERATURE: 98 F | HEART RATE: 60 BPM | HEIGHT: 60 IN | OXYGEN SATURATION: 100 % | RESPIRATION RATE: 18 BRPM | SYSTOLIC BLOOD PRESSURE: 148 MMHG | DIASTOLIC BLOOD PRESSURE: 85 MMHG

## 2022-05-10 DIAGNOSIS — Z98.890 OTHER SPECIFIED POSTPROCEDURAL STATES: Chronic | ICD-10-CM

## 2022-05-10 LAB
ALBUMIN SERPL ELPH-MCNC: 4 G/DL — SIGNIFICANT CHANGE UP (ref 3.3–5)
ALP SERPL-CCNC: 69 U/L — SIGNIFICANT CHANGE UP (ref 40–120)
ALT FLD-CCNC: 20 U/L — SIGNIFICANT CHANGE UP (ref 4–41)
ANION GAP SERPL CALC-SCNC: 7 MMOL/L — SIGNIFICANT CHANGE UP (ref 7–14)
APTT BLD: 27.7 SEC — SIGNIFICANT CHANGE UP (ref 27–36.3)
AST SERPL-CCNC: 16 U/L — SIGNIFICANT CHANGE UP (ref 4–40)
BASOPHILS # BLD AUTO: 0.02 K/UL — SIGNIFICANT CHANGE UP (ref 0–0.2)
BASOPHILS NFR BLD AUTO: 0.4 % — SIGNIFICANT CHANGE UP (ref 0–2)
BILIRUB SERPL-MCNC: 0.4 MG/DL — SIGNIFICANT CHANGE UP (ref 0.2–1.2)
BUN SERPL-MCNC: 24 MG/DL — HIGH (ref 7–23)
CALCIUM SERPL-MCNC: 9.4 MG/DL — SIGNIFICANT CHANGE UP (ref 8.4–10.5)
CHLORIDE SERPL-SCNC: 102 MMOL/L — SIGNIFICANT CHANGE UP (ref 98–107)
CO2 SERPL-SCNC: 28 MMOL/L — SIGNIFICANT CHANGE UP (ref 22–31)
CREAT SERPL-MCNC: 1 MG/DL — SIGNIFICANT CHANGE UP (ref 0.5–1.3)
EGFR: 80 ML/MIN/1.73M2 — SIGNIFICANT CHANGE UP
EOSINOPHIL # BLD AUTO: 0.17 K/UL — SIGNIFICANT CHANGE UP (ref 0–0.5)
EOSINOPHIL NFR BLD AUTO: 3.3 % — SIGNIFICANT CHANGE UP (ref 0–6)
GLUCOSE SERPL-MCNC: 112 MG/DL — HIGH (ref 70–99)
HCT VFR BLD CALC: 46.6 % — SIGNIFICANT CHANGE UP (ref 39–50)
HGB BLD-MCNC: 16 G/DL — SIGNIFICANT CHANGE UP (ref 13–17)
IANC: 2.71 K/UL — SIGNIFICANT CHANGE UP (ref 1.8–7.4)
IMM GRANULOCYTES NFR BLD AUTO: 0.4 % — SIGNIFICANT CHANGE UP (ref 0–1.5)
INR BLD: 1.16 RATIO — SIGNIFICANT CHANGE UP (ref 0.88–1.16)
LYMPHOCYTES # BLD AUTO: 1.68 K/UL — SIGNIFICANT CHANGE UP (ref 1–3.3)
LYMPHOCYTES # BLD AUTO: 32.7 % — SIGNIFICANT CHANGE UP (ref 13–44)
MCHC RBC-ENTMCNC: 32.4 PG — SIGNIFICANT CHANGE UP (ref 27–34)
MCHC RBC-ENTMCNC: 34.3 GM/DL — SIGNIFICANT CHANGE UP (ref 32–36)
MCV RBC AUTO: 94.3 FL — SIGNIFICANT CHANGE UP (ref 80–100)
MONOCYTES # BLD AUTO: 0.54 K/UL — SIGNIFICANT CHANGE UP (ref 0–0.9)
MONOCYTES NFR BLD AUTO: 10.5 % — SIGNIFICANT CHANGE UP (ref 2–14)
NEUTROPHILS # BLD AUTO: 2.71 K/UL — SIGNIFICANT CHANGE UP (ref 1.8–7.4)
NEUTROPHILS NFR BLD AUTO: 52.7 % — SIGNIFICANT CHANGE UP (ref 43–77)
NRBC # BLD: 0 /100 WBCS — SIGNIFICANT CHANGE UP
NRBC # FLD: 0 K/UL — SIGNIFICANT CHANGE UP
PLATELET # BLD AUTO: 108 K/UL — LOW (ref 150–400)
POTASSIUM SERPL-MCNC: 4.2 MMOL/L — SIGNIFICANT CHANGE UP (ref 3.5–5.3)
POTASSIUM SERPL-SCNC: 4.2 MMOL/L — SIGNIFICANT CHANGE UP (ref 3.5–5.3)
PROT SERPL-MCNC: 6.8 G/DL — SIGNIFICANT CHANGE UP (ref 6–8.3)
PROTHROM AB SERPL-ACNC: 13.5 SEC — HIGH (ref 10.5–13.4)
RBC # BLD: 4.94 M/UL — SIGNIFICANT CHANGE UP (ref 4.2–5.8)
RBC # FLD: 12.3 % — SIGNIFICANT CHANGE UP (ref 10.3–14.5)
SARS-COV-2 RNA SPEC QL NAA+PROBE: SIGNIFICANT CHANGE UP
SODIUM SERPL-SCNC: 137 MMOL/L — SIGNIFICANT CHANGE UP (ref 135–145)
WBC # BLD: 5.14 K/UL — SIGNIFICANT CHANGE UP (ref 3.8–10.5)
WBC # FLD AUTO: 5.14 K/UL — SIGNIFICANT CHANGE UP (ref 3.8–10.5)

## 2022-05-10 PROCEDURE — 99285 EMERGENCY DEPT VISIT HI MDM: CPT

## 2022-05-10 PROCEDURE — 73590 X-RAY EXAM OF LOWER LEG: CPT | Mod: 26,RT

## 2022-05-10 PROCEDURE — 93970 EXTREMITY STUDY: CPT | Mod: 26

## 2022-05-10 RX ORDER — CEFAZOLIN SODIUM 1 G
1000 VIAL (EA) INJECTION EVERY 8 HOURS
Refills: 0 | Status: DISCONTINUED | OUTPATIENT
Start: 2022-05-11 | End: 2022-05-14

## 2022-05-10 RX ORDER — CEFAZOLIN SODIUM 1 G
VIAL (EA) INJECTION
Refills: 0 | Status: DISCONTINUED | OUTPATIENT
Start: 2022-05-10 | End: 2022-05-14

## 2022-05-10 RX ORDER — CEFAZOLIN SODIUM 1 G
1000 VIAL (EA) INJECTION ONCE
Refills: 0 | Status: COMPLETED | OUTPATIENT
Start: 2022-05-10 | End: 2022-05-10

## 2022-05-10 RX ORDER — CEPHALEXIN 500 MG
1 CAPSULE ORAL
Qty: 20 | Refills: 0
Start: 2022-05-10 | End: 2022-05-19

## 2022-05-10 RX ADMIN — Medication 100 MILLIGRAM(S): at 18:37

## 2022-05-10 NOTE — ED PROVIDER NOTE - OBJECTIVE STATEMENT
70 yo M, hx of chronic venous insuff and DVT in the past not on Eliquis currently, presenting w/ redness to a chronic wound over the RLE. Located over the medial aspect a little above the ankle joint. No associated fevers or chills. He reports a rash along the leg and thigh. Seen by outpatient wound doctor today, asked to come in after he saw the lesion to the right leg.  NKDA. s/p CABG and hx of arterial stent to the R thigh. no recent surgeries.

## 2022-05-10 NOTE — ED PROVIDER NOTE - NS ED ROS FT
GENERAL: no fever  EYES: no eye pain  HEENT: no neck pain  CARDIAC: no chest pain  PULMONARY: no SOB  GI: + bulge L groin, no abdominal pain, n/v, constipation/obstipation  : no dysuria  SKIN: + wound, + rash  NEURO: no headache  MSK: no new joint pain

## 2022-05-10 NOTE — ED PROVIDER NOTE - PATIENT PORTAL LINK FT
You can access the FollowMyHealth Patient Portal offered by [facility] by registering at the following website: [email or web address]. By joining [employer]’s FollowMyHealth portal, you will also be able to view your health information using other applications (apps) compatible with our system.

## 2022-05-10 NOTE — ED ADULT TRIAGE NOTE - CHIEF COMPLAINT QUOTE
pt sent to ED from vascular doctor.  pt has stasis ulcer to RLE.  arrives with dressing in place.  pt sen to ED for antibiotics

## 2022-05-10 NOTE — ED ADULT TRIAGE NOTE - ESI TRIAGE ACUITY LEVEL, MLM
PROCEDURE NOTE: Avastin 1.25mg OD. Diagnosis: Neovascular Glaucoma. Anesthesia: Topical. Prep: Betadine Drops. Prior to injection, risks/benefits/alternatives discussed including infection, loss of vision, hemorrhage, cataract, glaucoma, retinal tears or detachment. The off-label status of Intravitreal Avastin also was reviewed. The patient wished to proceed with treatment. Topical anesthesia was induced with Alcaine. Additional anesthesia was achieved using drop(s) or injection checked above. A drop of Povidone-iodine 5% ophthalmic solution was instilled over the injection site and in the inferior fornix. Betadine prep was performed. Using the syringe provided, Avastin 1.25 mg in 0.05 cc was injected into the vitreous cavity. The needle was passed 3.0 mm posterior to the limbus in pseudophakic patients, and 3.5 mm posterior to the limbus in phakic patients. Patient tolerated procedure well. There were no complications. Injection time: 814. Lot #: [email or web address]. Expiration Date: Mon Mar 04 2019 00:00:00 T-0500 Peyton Oh Standard Time). Post-op instructions given. Inventory Id: null. The patient was instructed to return for re-evaluation in approximately 4-12 weeks depending on his/her condition and was told to call immediately if vision decreases and/or if his/her eye becomes red, painful, and/or light sensitive. The patient was instructed to go to the emergency room or call 911 if unable to reach the doctor within an hour or two of trying or calling. The patient was instructed to use Artificial Tears q.i.d. p.r.n for comfort. Enmanuel Arnold
3

## 2022-05-10 NOTE — ED ADULT NURSE NOTE - OBJECTIVE STATEMENT
PT sent to ED from vascular doctor.  pt has stasis ulcer to RLE.  arrives with dressing in place.  PT A&OX4.  No distress noted.  PT c/o pain to RLE, rated 4/10.  RLE stasis ulcer 5X2.5 and 1X1.  RLE noted warm to touch.  Right IVL in place and tolerating well.  Denies CP, no SOB noted.  PT ambulatory, able to move all extremities.  Resp WDL.  Will continue to monitor.

## 2022-05-10 NOTE — ED PROVIDER NOTE - NSFOLLOWUPINSTRUCTIONS_ED_ALL_ED_FT
You were seen for leg redness and a wound. You were found to have a blood clot in the leg as well as an infection. Please continue to Xarelto you have at home and see your vascular doctor or primary care doctor for refills. Please take Keflex (sent to your pharmacy) for 10 days.     Return to the Emergency Room if you have increased pain, numbness, tingling, chest pain, shortness of breath, fevers, chills, nausea vomiting.

## 2022-05-10 NOTE — ED PROVIDER NOTE - CLINICAL SUMMARY MEDICAL DECISION MAKING FREE TEXT BOX
70 yo M, hx of chronic venous insuff and DVT in the past not on Eliquis currently, presenting w/ redness to a chronic wound over the RLE. vss. r/out dvt, assess for necrotizing infection. likely cellulitis w/ associated lymphangitis. cefazolin, cmp, cbc, coags, plain films for evaluation of free air and duplex us.

## 2022-05-10 NOTE — ED PROVIDER NOTE - PHYSICAL EXAMINATION
Gen: NAD, non-toxic appearing  Head: normal appearing  HEENT: normal conjunctiva  Lung: no respiratory distress, speaking in full sentences, cta b/l      CV: regular rate and rhythm, no murmurs  Abd: soft, non distended, non tender, the L inguinal area was examined, no herniation while bearing down, appears symmetric to the contralateral side.   MSK: no visible deformities, no calf tenderness.   Neuro: alert and grossly oriented, no gross motor deficits  Skin: There is a 2 cm ulcerative wound over the medial aspect of the leg 2/3rds of the way distally. There is associated skin breakdown and mild warmth. No drainage or fluctuance. Associated 1+ edema. There is tracking along the lymphatic pattern up the thigh. This is not tender to the touch.

## 2022-05-10 NOTE — ED PROVIDER NOTE - ATTENDING CONTRIBUTION TO CARE
70 yo male with PMH chronic venous insuff and DVT in the past not on Eliquis currently for evaluation of RLE wound with new redness streaking into thigh PE+ erythema right calf to thigh, wound not draining A/P Labs, imaging, medicate, reassess

## 2022-05-10 NOTE — ED PROVIDER NOTE - PROGRESS NOTE DETAILS
US shows popliteal thrombus. Discussed results with patient. Will send home, patient can continue Xarelto (patient has enough Xarelto at home) and follow up with vascular Dr. and PCP for refill. Will send Keflex to pharmacy for possible cellulitis.     Geraldine Davis MD, PGY1

## 2022-05-10 NOTE — ED PROVIDER NOTE - NSICDXPASTSURGICALHX_GEN_ALL_CORE_FT
PAST SURGICAL HISTORY:  History of skin surgery s/p excision of cancerous lesion on back under  general anesthesia in 2016    S/P thyroid surgery 2018

## 2022-12-10 ENCOUNTER — EMERGENCY (EMERGENCY)
Facility: HOSPITAL | Age: 72
LOS: 1 days | Discharge: HOME | End: 2022-12-10
Attending: STUDENT IN AN ORGANIZED HEALTH CARE EDUCATION/TRAINING PROGRAM | Admitting: STUDENT IN AN ORGANIZED HEALTH CARE EDUCATION/TRAINING PROGRAM

## 2022-12-10 VITALS
DIASTOLIC BLOOD PRESSURE: 81 MMHG | SYSTOLIC BLOOD PRESSURE: 145 MMHG | RESPIRATION RATE: 14 BRPM | OXYGEN SATURATION: 100 % | TEMPERATURE: 98 F | HEART RATE: 79 BPM

## 2022-12-10 DIAGNOSIS — Z98.890 OTHER SPECIFIED POSTPROCEDURAL STATES: Chronic | ICD-10-CM

## 2022-12-10 LAB
ALBUMIN SERPL ELPH-MCNC: 3.4 G/DL — SIGNIFICANT CHANGE UP (ref 3.3–5)
ALP SERPL-CCNC: 69 U/L — SIGNIFICANT CHANGE UP (ref 40–120)
ALT FLD-CCNC: 82 U/L — HIGH (ref 4–41)
ANION GAP SERPL CALC-SCNC: 10 MMOL/L — SIGNIFICANT CHANGE UP (ref 7–14)
APTT BLD: 38.4 SEC — HIGH (ref 27–36.3)
AST SERPL-CCNC: 65 U/L — HIGH (ref 4–40)
BASOPHILS # BLD AUTO: 0.02 K/UL — SIGNIFICANT CHANGE UP (ref 0–0.2)
BASOPHILS NFR BLD AUTO: 0.3 % — SIGNIFICANT CHANGE UP (ref 0–2)
BILIRUB SERPL-MCNC: 0.2 MG/DL — SIGNIFICANT CHANGE UP (ref 0.2–1.2)
BUN SERPL-MCNC: 13 MG/DL — SIGNIFICANT CHANGE UP (ref 7–23)
CALCIUM SERPL-MCNC: 9.1 MG/DL — SIGNIFICANT CHANGE UP (ref 8.4–10.5)
CHLORIDE SERPL-SCNC: 101 MMOL/L — SIGNIFICANT CHANGE UP (ref 98–107)
CO2 SERPL-SCNC: 25 MMOL/L — SIGNIFICANT CHANGE UP (ref 22–31)
CREAT SERPL-MCNC: 0.85 MG/DL — SIGNIFICANT CHANGE UP (ref 0.5–1.3)
EGFR: 92 ML/MIN/1.73M2 — SIGNIFICANT CHANGE UP
EOSINOPHIL # BLD AUTO: 0.2 K/UL — SIGNIFICANT CHANGE UP (ref 0–0.5)
EOSINOPHIL NFR BLD AUTO: 3.4 % — SIGNIFICANT CHANGE UP (ref 0–6)
GLUCOSE SERPL-MCNC: 117 MG/DL — HIGH (ref 70–99)
HCT VFR BLD CALC: 45.1 % — SIGNIFICANT CHANGE UP (ref 39–50)
HGB BLD-MCNC: 15.3 G/DL — SIGNIFICANT CHANGE UP (ref 13–17)
IANC: 3.39 K/UL — SIGNIFICANT CHANGE UP (ref 1.8–7.4)
IMM GRANULOCYTES NFR BLD AUTO: 1 % — HIGH (ref 0–0.9)
INR BLD: 2.1 RATIO — HIGH (ref 0.88–1.16)
LYMPHOCYTES # BLD AUTO: 1.35 K/UL — SIGNIFICANT CHANGE UP (ref 1–3.3)
LYMPHOCYTES # BLD AUTO: 23.1 % — SIGNIFICANT CHANGE UP (ref 13–44)
MCHC RBC-ENTMCNC: 32.3 PG — SIGNIFICANT CHANGE UP (ref 27–34)
MCHC RBC-ENTMCNC: 33.9 GM/DL — SIGNIFICANT CHANGE UP (ref 32–36)
MCV RBC AUTO: 95.1 FL — SIGNIFICANT CHANGE UP (ref 80–100)
MONOCYTES # BLD AUTO: 0.82 K/UL — SIGNIFICANT CHANGE UP (ref 0–0.9)
MONOCYTES NFR BLD AUTO: 14 % — SIGNIFICANT CHANGE UP (ref 2–14)
NEUTROPHILS # BLD AUTO: 3.39 K/UL — SIGNIFICANT CHANGE UP (ref 1.8–7.4)
NEUTROPHILS NFR BLD AUTO: 58.2 % — SIGNIFICANT CHANGE UP (ref 43–77)
NRBC # BLD: 0 /100 WBCS — SIGNIFICANT CHANGE UP (ref 0–0)
NRBC # FLD: 0 K/UL — SIGNIFICANT CHANGE UP (ref 0–0)
PLATELET # BLD AUTO: 142 K/UL — LOW (ref 150–400)
POTASSIUM SERPL-MCNC: 4.6 MMOL/L — SIGNIFICANT CHANGE UP (ref 3.5–5.3)
POTASSIUM SERPL-SCNC: 4.6 MMOL/L — SIGNIFICANT CHANGE UP (ref 3.5–5.3)
PROT SERPL-MCNC: 7 G/DL — SIGNIFICANT CHANGE UP (ref 6–8.3)
PROTHROM AB SERPL-ACNC: 24.5 SEC — HIGH (ref 10.5–13.4)
RBC # BLD: 4.74 M/UL — SIGNIFICANT CHANGE UP (ref 4.2–5.8)
RBC # FLD: 12.5 % — SIGNIFICANT CHANGE UP (ref 10.3–14.5)
SODIUM SERPL-SCNC: 136 MMOL/L — SIGNIFICANT CHANGE UP (ref 135–145)
WBC # BLD: 5.84 K/UL — SIGNIFICANT CHANGE UP (ref 3.8–10.5)
WBC # FLD AUTO: 5.84 K/UL — SIGNIFICANT CHANGE UP (ref 3.8–10.5)

## 2022-12-10 PROCEDURE — 71046 X-RAY EXAM CHEST 2 VIEWS: CPT | Mod: 26

## 2022-12-10 PROCEDURE — 99284 EMERGENCY DEPT VISIT MOD MDM: CPT

## 2022-12-10 NOTE — ED PROVIDER NOTE - NS ED ROS FT
Constitutional: No fever, chills.  Eyes:  No visual changes  ENMT:  +nosebleed   Cardiac:  No chest pain  Respiratory:  +cough  GI:  No nausea, vomiting, diarrhea, abdominal pain.  :  No dysuria, hematuria  MS:  No back pain.  Neuro:  No headache or lightheadedness  Skin:  No skin rash  Except as documented in the HPI,  all other systems are negative.

## 2022-12-10 NOTE — ED ADULT TRIAGE NOTE - CHIEF COMPLAINT QUOTE
pt c/o 4 days nose bleed since having flu/ covid swab done at . pt on xarelto. PMH: DVT, HTN, boarderline DM [id number]

## 2022-12-10 NOTE — ED PROVIDER NOTE - PATIENT PORTAL LINK FT
You can access the FollowMyHealth Patient Portal offered by [facility] by registering at the following website: [email or web address]. By joining [employer]’s FollowMyHealth portal, you will also be able to view your health information using other applications (apps) compatible with our system.

## 2022-12-10 NOTE — ED PROVIDER NOTE - NSFOLLOWUPCLINICS_GEN_ALL_ED_FT
[facility] - ENT  Otolaryngology (ENT)  [address] [place] [address]  Phone: [phone]  Fax:

## 2022-12-10 NOTE — ED PROVIDER NOTE - PROGRESS NOTE DETAILS
Maite Cohn PGY-3 no further nose bleeding episodes  nose bleed faq printed and given to patient by [email or web address]  stable for discharge

## 2022-12-10 NOTE — ED PROVIDER NOTE - PHYSICAL EXAMINATION
Vital signs reviewed  GENERAL: Patient nontoxic appearing, NAD  HEAD: NCAT  EYES: Anicteric  ENT: MMM. b/l nostril dry blood. no active bleeding   NECK: Supple, non tender  RESPIRATORY: Normal respiratory effort. CTA B/L. No wheezing, rales, rhonchi  CARDIOVASCULAR: Regular rate and rhythm  ABDOMEN: Soft. Nondistended. Nontender. No guarding or rebound. No CVA tenderness.  MUSCULOSKELETAL/EXTREMITIES: Brisk cap refill. 2+ radial pulses. No leg edema.  SKIN:  Warm and dry  NEURO: AAOx3. No gross FND.  PSYCHIATRIC: Cooperative. Affect appropriate.

## 2022-12-10 NOTE — ED ADULT NURSE NOTE - CHIEF COMPLAINT QUOTE
pt c/o 4 days nose bleed since having flu/ covid swab done at . pt on xarelto. PMH: DVT, HTN, boarderline DM [id number]

## 2022-12-10 NOTE — ED PROVIDER NOTE - OBJECTIVE STATEMENT
73 yo m pmh HTN, HLD, thyroid cancer s/p thyroidectomy with XRT on 2018 with subsequent hypothyroidism (now on synthroid), Xarelto, pw nosebleed. Patient reports approximately 4 days prior he was swabbed for flu/COVID, flu a positive, started on tamiflu by ..  Reports since then has had intermittent nosebleeds, last for approximately 1-2 hour and then self resolved. b/l nostrils. +malaise, +nasal congestion, +body aches and cough.   never had similar episodes in past  otherwise no cp, sob, vomiting, diarrhea, dizziness, back pain

## 2022-12-10 NOTE — ED PROVIDER NOTE - CLINICAL SUMMARY MEDICAL DECISION MAKING FREE TEXT BOX
71 yo m issac Paulino nosebleed. normotensive, not tachycardic, no active bleeding on exam. eval for anemia and coaguloapthy given bleeding episodes, will monitor in ED. cxr given cough and flu A dx for any focal infiltrate. likely traumatic 2/2 to nasal swab vs dry nasal turbinates 2/2 to URI

## 2022-12-10 NOTE — ED ADULT NURSE NOTE - OBJECTIVE STATEMENT
Pt received to room 14 A&OX4 ambulatory c/o intermittent bloody nose x 4 days. Hx stents x 4, DVT on Xarelto. No active bleeding. Pt denies difficulty swallowing or breathing. Respirations even and unlabored. 20G IV placed in RAC. Labs drawn and sent. Pt flu+ 6 days ago.

## 2023-03-13 NOTE — ED PROVIDER NOTE - ATTENDING CONTRIBUTION TO CARE
Nahun Hutchins DO:  patient seen and evaluated with the resident.  I was present for key portions of the History & Physical, and I agree with the Impression & Plan. 71 yo m pmh HTN, HLD, thyroid cancer s/p thyroidectomy with XRT on 2018 with subsequent hypothyroidism (now on synthroid), Xarelto, pw nosebleed. Patient reports approximately 4 days prior he was swabbed for flu/COVID, flu a positive.  Reports since then has had intermittent nosebleeds, last for approximately 1 hour and then self resolved.  Patient denies trauma, headache.  Patient does report fatigue, dry cough, myalgias.  Denies recent travel.  Patient arrives HDS, well-appearing, mild dried blood in the right ear.  No visualized clots.  No visualized active bleeding.  Oropharynx clear.  Do not suspect patient to have active bleed, will check blood, chest x-ray due to cough likely DC home on nasal spray. Nahun Hutchins DO:  patient seen and evaluated with the resident.  I was present for key portions of the History & Physical, and I agree with the Impression & Plan. 73 yo m pmh HTN, HLD, thyroid cancer s/p thyroidectomy with XRT on 2018 with subsequent hypothyroidism (now on synthroid), Xarelto, pw nosebleed. Patient reports approximately 4 days prior he was swabbed for flu/COVID, flu a positive.  Reports since then has had intermittent nosebleeds, last for approximately 1 hour and then self resolved.  Patient denies trauma, headache.  Patient does report fatigue, dry cough, myalgias.  Denies recent travel.  Patient arrives HDS, well-appearing, mild dried blood in the right ear.  No visualized clots.  No visualized active bleeding.  Oropharynx clear.  Do not suspect patient to have active bleed, will check blood, chest x-ray due to cough likely DC home on nasal spray.. none

## 2023-04-12 ENCOUNTER — TRANSCRIPTION ENCOUNTER (OUTPATIENT)
Age: 73
End: 2023-04-12

## 2023-04-12 VITALS
HEIGHT: 69 IN | HEART RATE: 66 BPM | SYSTOLIC BLOOD PRESSURE: 157 MMHG | TEMPERATURE: 97 F | WEIGHT: 157.19 LBS | DIASTOLIC BLOOD PRESSURE: 84 MMHG | RESPIRATION RATE: 18 BRPM

## 2023-04-12 NOTE — ASU PREOP CHECKLIST - 3.
As per Dr Fuentes, pt instructed to take ASA 81 tonight and DOS. together with metoprolol and synthroid

## 2023-04-12 NOTE — ASU PREOP CHECKLIST - 2.
pt has 2 coronary stents, DVT, as per cardiologist pt stopped plavix 3 weeks ago, and ASA 81 5 days ago

## 2023-04-12 NOTE — ASU PATIENT PROFILE, ADULT - NSICDXPASTMEDICALHX_GEN_ALL_CORE_FT
PAST MEDICAL HISTORY:  Chronic venous insufficiency BLE, pain edema    DM (diabetes mellitus)     DVT (deep venous thrombosis) R, dx 2/2020    Hyperlipidemia     Hypertension     Hypothyroid      PAST MEDICAL HISTORY:  Chronic venous insufficiency BLE, pain edema    DVT (deep venous thrombosis) R, dx 2/2020    Hyperlipidemia     Hypertension     Hypothyroid     Prediabetes

## 2023-04-13 ENCOUNTER — TRANSCRIPTION ENCOUNTER (OUTPATIENT)
Age: 73
End: 2023-04-13

## 2023-04-13 ENCOUNTER — INPATIENT (INPATIENT)
Facility: HOSPITAL | Age: 73
LOS: 0 days | Discharge: HOME | DRG: 352 | End: 2023-04-14
Attending: SURGERY | Admitting: SURGERY
Payer: MEDICARE

## 2023-04-13 DIAGNOSIS — K40.90 UNILATERAL INGUINAL HERNIA, WITHOUT OBSTRUCTION OR GANGRENE, NOT SPECIFIED AS RECURRENT: ICD-10-CM

## 2023-04-13 DIAGNOSIS — Z98.890 OTHER SPECIFIED POSTPROCEDURAL STATES: Chronic | ICD-10-CM

## 2023-04-13 LAB
GLUCOSE BLDC GLUCOMTR-MCNC: 108 MG/DL — HIGH (ref 70–99)
GLUCOSE BLDC GLUCOMTR-MCNC: 124 MG/DL — HIGH (ref 70–99)
GLUCOSE BLDC GLUCOMTR-MCNC: 125 MG/DL — HIGH (ref 70–99)

## 2023-04-13 DEVICE — MESH HERNIA INGUINAL 3DMAX LARGE 4 X 6" RIGHT: Type: IMPLANTABLE DEVICE | Site: BILATERAL | Status: FUNCTIONAL

## 2023-04-13 DEVICE — ETHICON HERNIA SECURESTRAP 5MM SIZE 25: Type: IMPLANTABLE DEVICE | Site: BILATERAL | Status: FUNCTIONAL

## 2023-04-13 DEVICE — TROCAR COVIDIEN SPACEMAKER PRO BLUNT TIP 10MM-12MM WITH DISSECTION BALLOON OVAL: Type: IMPLANTABLE DEVICE | Site: BILATERAL | Status: FUNCTIONAL

## 2023-04-13 DEVICE — MESH HERNIA INGUINAL 3DMAX LARGE 4 X 6" LEFT: Type: IMPLANTABLE DEVICE | Site: BILATERAL | Status: FUNCTIONAL

## 2023-04-13 RX ORDER — LEVOTHYROXINE SODIUM 125 MCG
125 TABLET ORAL DAILY
Refills: 0 | Status: DISCONTINUED | OUTPATIENT
Start: 2023-04-13 | End: 2023-04-14

## 2023-04-13 RX ORDER — DEXTROSE 50 % IN WATER 50 %
15 SYRINGE (ML) INTRAVENOUS ONCE
Refills: 0 | Status: DISCONTINUED | OUTPATIENT
Start: 2023-04-13 | End: 2023-04-14

## 2023-04-13 RX ORDER — METOPROLOL TARTRATE 50 MG
50 TABLET ORAL EVERY 12 HOURS
Refills: 0 | Status: DISCONTINUED | OUTPATIENT
Start: 2023-04-13 | End: 2023-04-14

## 2023-04-13 RX ORDER — ASPIRIN/CALCIUM CARB/MAGNESIUM 324 MG
1 TABLET ORAL
Refills: 0 | DISCHARGE

## 2023-04-13 RX ORDER — DEXTROSE 50 % IN WATER 50 %
25 SYRINGE (ML) INTRAVENOUS ONCE
Refills: 0 | Status: DISCONTINUED | OUTPATIENT
Start: 2023-04-13 | End: 2023-04-14

## 2023-04-13 RX ORDER — ACETAMINOPHEN 500 MG
650 TABLET ORAL EVERY 6 HOURS
Refills: 0 | Status: DISCONTINUED | OUTPATIENT
Start: 2023-04-13 | End: 2023-04-14

## 2023-04-13 RX ORDER — ENOXAPARIN SODIUM 100 MG/ML
40 INJECTION SUBCUTANEOUS EVERY 24 HOURS
Refills: 0 | Status: DISCONTINUED | OUTPATIENT
Start: 2023-04-13 | End: 2023-04-14

## 2023-04-13 RX ORDER — DEXTROSE 50 % IN WATER 50 %
12.5 SYRINGE (ML) INTRAVENOUS ONCE
Refills: 0 | Status: DISCONTINUED | OUTPATIENT
Start: 2023-04-13 | End: 2023-04-14

## 2023-04-13 RX ORDER — INSULIN LISPRO 100/ML
VIAL (ML) SUBCUTANEOUS
Refills: 0 | Status: DISCONTINUED | OUTPATIENT
Start: 2023-04-13 | End: 2023-04-14

## 2023-04-13 RX ORDER — SODIUM CHLORIDE 9 MG/ML
1000 INJECTION, SOLUTION INTRAVENOUS
Refills: 0 | Status: DISCONTINUED | OUTPATIENT
Start: 2023-04-13 | End: 2023-04-14

## 2023-04-13 RX ORDER — ASPIRIN/CALCIUM CARB/MAGNESIUM 324 MG
81 TABLET ORAL DAILY
Refills: 0 | Status: DISCONTINUED | OUTPATIENT
Start: 2023-04-13 | End: 2023-04-13

## 2023-04-13 RX ORDER — PANTOPRAZOLE SODIUM 20 MG/1
40 TABLET, DELAYED RELEASE ORAL
Refills: 0 | Status: DISCONTINUED | OUTPATIENT
Start: 2023-04-13 | End: 2023-04-14

## 2023-04-13 RX ORDER — SODIUM CHLORIDE 9 MG/ML
1000 INJECTION INTRAMUSCULAR; INTRAVENOUS; SUBCUTANEOUS
Refills: 0 | Status: DISCONTINUED | OUTPATIENT
Start: 2023-04-13 | End: 2023-04-14

## 2023-04-13 RX ORDER — SENNA PLUS 8.6 MG/1
2 TABLET ORAL AT BEDTIME
Refills: 0 | Status: DISCONTINUED | OUTPATIENT
Start: 2023-04-13 | End: 2023-04-14

## 2023-04-13 RX ORDER — METOPROLOL TARTRATE 50 MG
1 TABLET ORAL
Refills: 0 | DISCHARGE

## 2023-04-13 RX ORDER — GLUCAGON INJECTION, SOLUTION 0.5 MG/.1ML
1 INJECTION, SOLUTION SUBCUTANEOUS ONCE
Refills: 0 | Status: DISCONTINUED | OUTPATIENT
Start: 2023-04-13 | End: 2023-04-14

## 2023-04-13 RX ORDER — HYDROMORPHONE HYDROCHLORIDE 2 MG/ML
0.5 INJECTION INTRAMUSCULAR; INTRAVENOUS; SUBCUTANEOUS ONCE
Refills: 0 | Status: DISCONTINUED | OUTPATIENT
Start: 2023-04-13 | End: 2023-04-14

## 2023-04-13 RX ORDER — OXYCODONE HYDROCHLORIDE 5 MG/1
5 TABLET ORAL EVERY 4 HOURS
Refills: 0 | Status: DISCONTINUED | OUTPATIENT
Start: 2023-04-13 | End: 2023-04-14

## 2023-04-13 RX ORDER — CLOPIDOGREL BISULFATE 75 MG/1
75 TABLET, FILM COATED ORAL DAILY
Refills: 0 | Status: DISCONTINUED | OUTPATIENT
Start: 2023-04-13 | End: 2023-04-13

## 2023-04-13 RX ADMIN — ENOXAPARIN SODIUM 40 MILLIGRAM(S): 100 INJECTION SUBCUTANEOUS at 21:30

## 2023-04-13 RX ADMIN — Medication 50 MILLIGRAM(S): at 17:22

## 2023-04-13 RX ADMIN — SENNA PLUS 2 TABLET(S): 8.6 TABLET ORAL at 21:31

## 2023-04-13 RX ADMIN — SODIUM CHLORIDE 80 MILLILITER(S): 9 INJECTION INTRAMUSCULAR; INTRAVENOUS; SUBCUTANEOUS at 13:36

## 2023-04-13 NOTE — BRIEF OPERATIVE NOTE - NSICDXBRIEFPOSTOP_GEN_ALL_CORE_FT
POST-OP DIAGNOSIS:  Inguinal hernia 13-Apr-2023 12:36:20 left direct, right direct and indirect hernia Saima Joseph

## 2023-04-13 NOTE — PRE-ANESTHESIA EVALUATION ADULT - HEIGHT IN FEET
Chemodenervation  Date/Time: 1/31/2020 1:28 PM  Performed by: Greg Lundberg PA-C  Authorized by: Greg Lundberg PA-C     Pre-procedure details:     Prepped With: Alcohol    Anesthesia (see MAR for exact dosages): Anesthesia method:  None  Procedure details:     Position:  Upright  Botox:     Botox Type:  Type A    Brand:  Botox    mL's of Botulinum Toxin:  200    Final Concentration per CC:  200 units    Needle Gauge:  30 G 2 5 inch  Procedures:     Botox Procedures: chronic headache      Indications: migraines    Injection Location:     Head / Face:  L superior cervical paraspinal, R superior cervical paraspinal, L , R , L frontalis, R frontalis, L medial occipitalis, R medial occipitalis, procerus, R temporalis, L temporalis, R superior trapezius and L superior trapezius    L  injection amount:  5 unit(s)    R  injection amount:  5 unit(s)    L lateral frontalis:  5 unit(s)    R lateral frontalis:  5 unit(s)    L medial frontalis:  5 unit(s)    R medial frontalis:  5 unit(s)    L temporalis injection amount:  20 unit(s)    R temporalis injection amount:  20 unit(s)    Procerus injection amount:  5 unit(s)    L medial occipitalis injection amount:  15 unit(s)    R medial occipitalis injection amount:  15 unit(s)    L superior cervical paraspinal injection amount:  10 unit(s)    R superior cervical paraspinal injection amount:  10 unit(s)    L superior trapezius injection amount:  15 unit(s)    R superior trapezius injection amount:  15 unit(s)  Total Units:     Total units used:  200    Total units discarded:  0  Post-procedure details:     Chemodenervation:  Chronic migraine    Facial Nerve Location[de-identified]  Bilateral facial nerve    Patient tolerance of procedure:   Tolerated well, no immediate complications  Comments:      5 units orbicularis oculi bilaterally  10 units trapezius bilaterally  15 units temporalis  All medically necessary 5

## 2023-04-13 NOTE — H&P ADULT - NSHPPHYSICALEXAM_GEN_ALL_CORE
T(C): 36.3 (04-13-23 @ 09:35), Max: 36.3 (04-13-23 @ 09:35)  HR: 66 (04-13-23 @ 09:35) (66 - 66)  BP: 157/84 (04-13-23 @ 09:35) (157/84 - 157/84)  RR: 18 (04-13-23 @ 09:35) (18 - 18)  SpO2: --    CONSTITUTIONAL: Well groomed, no apparent distress  EYES: PERRLA and symmetric, EOMI, No conjunctival or scleral injection, non-icteric  ENMT: Oral mucosa with moist membranes. Normal dentition; no pharyngeal injection or exudates             NECK: Supple, symmetric and without tracheal deviation   RESP: No respiratory distress, no use of accessory muscles; CTA b/l, no WRR  CV: RRR, +S1S2, no MRG; no JVD; no peripheral edema  GI: Soft, NT, ND, no rebound, no guarding; no palpable masses; no hepatosplenomegaly; no hernia palpated  SKIN: No rashes or ulcers noted; no subcutaneous nodules or induration palpable  NEURO: CN II-XII intact; normal reflexes in upper and lower extremities, sensation intact in upper and lower extremities b/l to light touch   PSYCH: Appropriate insight/judgment; A+O x 3, mood and affect appropriate, recent/remote memory intact

## 2023-04-13 NOTE — CONSULT NOTE ADULT - SUBJECTIVE AND OBJECTIVE BOX
72 Male with PMH of HTN, HLD, DMII, Chronic venous insufficiency, CKD (s/p robotic CABG 2 stents) 9/2020), thyroid cancer s/p thyroidectomy with XRT in 2018 with subsequent hypothyroidism (now on synthroid) presented to [facility] for b/l hernia repair. Patient complaining of increasing urination while on IV Fluids, patient cleared outpatient by cardioglogist for procedure and underwent stress testing. Patient with METS > 4 able to participate in ADLS with vigorous exercise. Patient has close f/u for CKD       VITAL SIGNS:  T(F): 98 (04-13-23 @ 16:00)  HR: 79 (04-13-23 @ 16:00)  BP: 161/80 (04-13-23 @ 16:00)  RR: 18 (04-13-23 @ 16:00)  SpO2: 95% (04-13-23 @ 16:00)  Wt(kg): --      04-13-23 @ 07:01  -  04-13-23 @ 17:06  --------------------------------------------------------  IN: 240 mL / OUT: 240 mL / NET: 0 mL        PHYSICAL EXAM:    Constitutional: resting comfortably in bed; NAD  HEENT: NC/AT, PER, anicteric sclera, no nasal discharge; MMM  Neck: supple; no JVD or thyromegaly  Respiratory: CTA B/L; no W/R/R, no retractions  Cardiac: +S1/S2; RRR; no M/R/G  Gastrointestinal: soft, NT/ND; no rebound or guarding  Back: spine midline, no bony tenderness or step-offs; no CVAT B/L  Extremities: WWP, no clubbing or cyanosis; no peripheral edema  Musculoskeletal: NROM x4; no joint swelling, tenderness or erythema  Vascular: 2+ radial, DP/PT pulses B/L  Dermatologic: skin warm, dry and intact; no rashes, wounds, or scars  Neurologic: AAOx3; CNII-XII grossly intact; no focal deficits  Psychiatric: affect and characteristics of appearance, verbalizations, behaviors are appropriate    MEDICATIONS  (STANDING):  dextrose 5%. 1000 milliLiter(s) (50 mL/Hr) IV Continuous <Continuous>  dextrose 5%. 1000 milliLiter(s) (100 mL/Hr) IV Continuous <Continuous>  dextrose 50% Injectable 25 Gram(s) IV Push once  dextrose 50% Injectable 12.5 Gram(s) IV Push once  dextrose 50% Injectable 25 Gram(s) IV Push once  enoxaparin Injectable 40 milliGRAM(s) SubCutaneous every 24 hours  glucagon  Injectable 1 milliGRAM(s) IntraMuscular once  insulin lispro (ADMELOG) corrective regimen sliding scale   SubCutaneous Before meals and at bedtime  levothyroxine 125 MICROGram(s) Oral daily  metoprolol tartrate 50 milliGRAM(s) Oral every 12 hours  pantoprazole    Tablet 40 milliGRAM(s) Oral before breakfast  senna 2 Tablet(s) Oral at bedtime  sodium chloride 0.9%. 1000 milliLiter(s) (80 mL/Hr) IV Continuous <Continuous>    MEDICATIONS  (PRN):  acetaminophen     Tablet .. 650 milliGRAM(s) Oral every 6 hours PRN Mild Pain (1 - 3), Moderate Pain (4 - 6)  dextrose Oral Gel 15 Gram(s) Oral once PRN Blood Glucose LESS THAN 70 milliGRAM(s)/deciliter  HYDROmorphone  Injectable 0.5 milliGRAM(s) IV Push once PRN Severe Pain (7 - 10)  oxyCODONE    IR 5 milliGRAM(s) Oral every 4 hours PRN Severe Pain (7 - 10)      Allergies    No Known Drug Allergies  Shrimp (Angioedema)    Intolerances        LABS:                RADIOLOGY & ADDITIONAL TESTS:  Reviewed

## 2023-04-13 NOTE — PRE-ANESTHESIA EVALUATION ADULT - NSANTHPMHFT_GEN_ALL_CORE
METS >4   Denies exertional chest pain, cardiology clearance in chart    Denies DM - "pre-diabetes", not on any medication

## 2023-04-13 NOTE — H&P ADULT - NSICDXPASTMEDICALHX_GEN_ALL_CORE_FT
PAST MEDICAL HISTORY:  Chronic venous insufficiency BLE, pain edema    DVT (deep venous thrombosis) R, dx 2/2020    Hyperlipidemia     Hypertension     Hypothyroid     Prediabetes

## 2023-04-13 NOTE — H&P ADULT - NSHPLABSRESULTS_GEN_ALL_CORE
72 Male with PMH of HTN, HLD, DMII, Chronic venous insufficiency, CKD (s/p robotic CABG 2 stents) 9/2020), thyroid cancer s/p thyroidectomy with XRT in 2018 with subsequent hypothyroidism (now on synthroid) presents for laparoscopic b/l inguinal hernia repair on 4/13, which was uncomplicated. Admitted to tele given cardiac history for post op monitoring.     Plan:  CC reg diet with sliding scale   Resume all home meds except Plavix  Resume plavix tomorrow   IVF  OOBA/IS  Pain and nausea control  Am labs 72 Male with PMH of HTN, HLD, DMII, Chronic venous insufficiency, CAD (s/p robotic CABG 2 stents) 9/2020), thyroid cancer s/p thyroidectomy with XRT in 2018 with subsequent hypothyroidism (now on synthroid) presents for laparoscopic b/l inguinal hernia repair on 4/13, which was uncomplicated. Admitted to tele given cardiac history for post op monitoring.     Plan:  CC reg diet with sliding scale   Resume all home meds except Plavix  Resume plavix tomorrow   IVF  OOBA/IS  Pain and nausea control  Am labs

## 2023-04-13 NOTE — CONSULT NOTE ADULT - ASSESSMENT
HTN, HLD, DMII, Chronic venous insufficiency, CKD (s/p robotic CABG 2 stents) 9/2020), thyroid cancer s/p thyroidectomy with XRT in 2018 with subsequent hypothyroidism (now on synthroid)   Patient with METS > 4 able to participate in ADLS with vigorous exercise. Patient has close f/u for CKD   continue home dose synthyroid   monitor routine labwork monitor creatinine if patient remains inpatient   hold PO Diabetic medications continue on ISS while inpatient, can send HBA1C if patient remains overnight    HTN / HLD medications - can restart as tolerated  Anti platelet medications - ASA / Plavix - restart post op, hold per surgery pre op

## 2023-04-13 NOTE — BRIEF OPERATIVE NOTE - OPERATION/FINDINGS
2cm vertical midline incision, with two laparoscopic port sites made in the midline. B/l hernia sac was carefully dissected and mesh was placed bilaterally. Left indirect hernia repaired, right direct and indirect hernia noted and b/l mesh was placed. Uncomplicated. Admitting to [facility] for post op monitoring. 2.5cm vertical midline incision made below the umbilicus, with two laparoscopic port sites made below the umbilicus in the midline. B/l hernia sac was carefully dissected and large mesh was placed and tacked on bilaterally. Left indirect hernia repaired, right direct and indirect hernia noted and b/l mesh was placed. Incisions were closed with 3-0 monocryl and derma-bond was applied. No drains, no foleys. Uncomplicated. Admitting to tele given cardiac history for post op monitoring.

## 2023-04-13 NOTE — BRIEF OPERATIVE NOTE - NSICDXBRIEFPREOP_GEN_ALL_CORE_FT
PRE-OP DIAGNOSIS:  Inguinal hernia 13-Apr-2023 12:35:57 left direct, right direct and indirect hernia Saima Joseph

## 2023-04-13 NOTE — H&P ADULT - HISTORY OF PRESENT ILLNESS
72 Male with PMH of HTN, HLD, DMII, Chronic venous insufficiency, CKD (s/p robotic CABG 2 stents) 9/2020), thyroid cancer s/p thyroidectomy with XRT in 2018 with subsequent hypothyroidism (now on synthroid) presents for laparoscopic b/l inguinal hernia repair pm 4/13.

## 2023-04-13 NOTE — PACU DISCHARGE NOTE - PAIN:
JOSEMANUEL   This patient is planning to follow up with [employer].  He stated that he has a lot of wrap around services and that he needs.  If this would fall through is aware that his referral is active for one year.    Controlled with current regime

## 2023-04-14 ENCOUNTER — TRANSCRIPTION ENCOUNTER (OUTPATIENT)
Age: 73
End: 2023-04-14

## 2023-04-14 VITALS
TEMPERATURE: 98 F | SYSTOLIC BLOOD PRESSURE: 138 MMHG | OXYGEN SATURATION: 96 % | HEART RATE: 55 BPM | DIASTOLIC BLOOD PRESSURE: 67 MMHG | RESPIRATION RATE: 17 BRPM

## 2023-04-14 LAB
ANION GAP SERPL CALC-SCNC: 10 MMOL/L — SIGNIFICANT CHANGE UP (ref 5–17)
BUN SERPL-MCNC: 24 MG/DL — HIGH (ref 7–23)
CALCIUM SERPL-MCNC: 8.5 MG/DL — SIGNIFICANT CHANGE UP (ref 8.4–10.5)
CHLORIDE SERPL-SCNC: 104 MMOL/L — SIGNIFICANT CHANGE UP (ref 96–108)
CO2 SERPL-SCNC: 24 MMOL/L — SIGNIFICANT CHANGE UP (ref 22–31)
CREAT SERPL-MCNC: 1.03 MG/DL — SIGNIFICANT CHANGE UP (ref 0.5–1.3)
EGFR: 77 ML/MIN/1.73M2 — SIGNIFICANT CHANGE UP
GLUCOSE BLDC GLUCOMTR-MCNC: 106 MG/DL — HIGH (ref 70–99)
GLUCOSE BLDC GLUCOMTR-MCNC: 113 MG/DL — HIGH (ref 70–99)
GLUCOSE SERPL-MCNC: 122 MG/DL — HIGH (ref 70–99)
HCT VFR BLD CALC: 46.8 % — SIGNIFICANT CHANGE UP (ref 39–50)
HGB BLD-MCNC: 16 G/DL — SIGNIFICANT CHANGE UP (ref 13–17)
MAGNESIUM SERPL-MCNC: 1.9 MG/DL — SIGNIFICANT CHANGE UP (ref 1.6–2.6)
MCHC RBC-ENTMCNC: 32.9 PG — SIGNIFICANT CHANGE UP (ref 27–34)
MCHC RBC-ENTMCNC: 34.2 GM/DL — SIGNIFICANT CHANGE UP (ref 32–36)
MCV RBC AUTO: 96.3 FL — SIGNIFICANT CHANGE UP (ref 80–100)
NRBC # BLD: 0 /100 WBCS — SIGNIFICANT CHANGE UP (ref 0–0)
PHOSPHATE SERPL-MCNC: 4 MG/DL — SIGNIFICANT CHANGE UP (ref 2.5–4.5)
PLATELET # BLD AUTO: 109 K/UL — LOW (ref 150–400)
POTASSIUM SERPL-MCNC: 4.2 MMOL/L — SIGNIFICANT CHANGE UP (ref 3.5–5.3)
POTASSIUM SERPL-SCNC: 4.2 MMOL/L — SIGNIFICANT CHANGE UP (ref 3.5–5.3)
RBC # BLD: 4.86 M/UL — SIGNIFICANT CHANGE UP (ref 4.2–5.8)
RBC # FLD: 12.6 % — SIGNIFICANT CHANGE UP (ref 10.3–14.5)
SODIUM SERPL-SCNC: 138 MMOL/L — SIGNIFICANT CHANGE UP (ref 135–145)
WBC # BLD: 8.52 K/UL — SIGNIFICANT CHANGE UP (ref 3.8–10.5)
WBC # FLD AUTO: 8.52 K/UL — SIGNIFICANT CHANGE UP (ref 3.8–10.5)

## 2023-04-14 PROCEDURE — 85027 COMPLETE CBC AUTOMATED: CPT

## 2023-04-14 PROCEDURE — C1781: CPT

## 2023-04-14 PROCEDURE — C1889: CPT

## 2023-04-14 PROCEDURE — 80048 BASIC METABOLIC PNL TOTAL CA: CPT

## 2023-04-14 PROCEDURE — 82962 GLUCOSE BLOOD TEST: CPT

## 2023-04-14 PROCEDURE — 83735 ASSAY OF MAGNESIUM: CPT

## 2023-04-14 PROCEDURE — C1727: CPT

## 2023-04-14 PROCEDURE — 84100 ASSAY OF PHOSPHORUS: CPT

## 2023-04-14 RX ORDER — OXYCODONE HYDROCHLORIDE 5 MG/1
1 TABLET ORAL
Qty: 20 | Refills: 0
Start: 2023-04-14 | End: 2023-04-18

## 2023-04-14 RX ORDER — CLOPIDOGREL BISULFATE 75 MG/1
75 TABLET, FILM COATED ORAL DAILY
Refills: 0 | Status: DISCONTINUED | OUTPATIENT
Start: 2023-04-14 | End: 2023-04-14

## 2023-04-14 RX ORDER — TAMSULOSIN HYDROCHLORIDE 0.4 MG/1
0.4 CAPSULE ORAL DAILY
Refills: 0 | Status: DISCONTINUED | OUTPATIENT
Start: 2023-04-14 | End: 2023-04-14

## 2023-04-14 RX ORDER — DOCUSATE SODIUM 100 MG
1 CAPSULE ORAL
Qty: 28 | Refills: 0
Start: 2023-04-14 | End: 2023-04-27

## 2023-04-14 RX ADMIN — TAMSULOSIN HYDROCHLORIDE 0.4 MILLIGRAM(S): 0.4 CAPSULE ORAL at 07:25

## 2023-04-14 RX ADMIN — CLOPIDOGREL BISULFATE 75 MILLIGRAM(S): 75 TABLET, FILM COATED ORAL at 13:11

## 2023-04-14 RX ADMIN — Medication 125 MICROGRAM(S): at 06:12

## 2023-04-14 RX ADMIN — Medication 50 MILLIGRAM(S): at 06:12

## 2023-04-14 RX ADMIN — PANTOPRAZOLE SODIUM 40 MILLIGRAM(S): 20 TABLET, DELAYED RELEASE ORAL at 06:12

## 2023-04-14 NOTE — DISCHARGE NOTE NURSING/CASE MANAGEMENT/SOCIAL WORK - PATIENT PORTAL LINK FT
Detail Level: Zone
You can access the FollowMyHealth Patient Portal offered by [facility] by registering at the following website: [email or web address]. By joining [facility]’s FollowMyHealth portal, you will also be able to view your health information using other applications (apps) compatible with our system.
Topical Steroids Counseling: I discussed with the patient that prolonged use of topical steroids can result in the increased appearance of superficial blood vessels (telangiectasias), lightening (hypopigmentation) and thinning of the skin (atrophy).  Patient understands to avoid using high potency steroids in skin folds, the groin or the face.  The patient verbalized understanding of the proper use and possible adverse effects of topical steroids.  All of the patient's questions and concerns were addressed.

## 2023-04-14 NOTE — DISCHARGE NOTE PROVIDER - NSDCCPCAREPLAN_GEN_ALL_CORE_FT
PRINCIPAL DISCHARGE DIAGNOSIS  Diagnosis: S/P inguinal hernia repair  Assessment and Plan of Treatment: b/l      SECONDARY DISCHARGE DIAGNOSES  Diagnosis: Inguinal hernia  Assessment and Plan of Treatment: left direct, right direct and indirect hernia

## 2023-04-14 NOTE — DISCHARGE NOTE PROVIDER - CARE PROVIDER_API CALL
Donna Whitehead (DO)  Surgery; Surgical Critical Care  [address] [place] [address]  Phone: [phone]  Fax: [phone]  Follow Up Time: 1 week

## 2023-04-14 NOTE — DISCHARGE NOTE PROVIDER - HOSPITAL COURSE
72M with PMH of HTN, HLD, thyroid cancer s/p thyroidectomy w/ XRT in 2018 with subsequent hypothyroidism (now on synthroid), skin cancer (s/p skin lesion removal) presents for laprascopic b/l inguinal hernia repair with mesh 4/13. Pt was taken to PACU in stable condition. Post op course has been uncomplicated. At the time of discharge, pt is tolerating diet, has return of bowel function, and am able to ambulate freely. Pt also passed his TOV. He is hemodynamically stable and is ready for discharge.

## 2023-04-14 NOTE — PROGRESS NOTE ADULT - SUBJECTIVE AND OBJECTIVE BOX
SUBJECTIVE: Seen and evaluated at bedside. NAEON. Pt endorses tolerating water but has not tried solids yet. Endorses difficulty urinating despite feeling urgency. Last night bladder scan was 540 and was straight cathed 575. Pt reports no prior  hx and has never seen a urologist.       MEDICATIONS  (STANDING):  clopidogrel Tablet 75 milliGRAM(s) Oral daily  dextrose 5%. 1000 milliLiter(s) (100 mL/Hr) IV Continuous <Continuous>  dextrose 5%. 1000 milliLiter(s) (50 mL/Hr) IV Continuous <Continuous>  dextrose 50% Injectable 25 Gram(s) IV Push once  dextrose 50% Injectable 12.5 Gram(s) IV Push once  dextrose 50% Injectable 25 Gram(s) IV Push once  enoxaparin Injectable 40 milliGRAM(s) SubCutaneous every 24 hours  glucagon  Injectable 1 milliGRAM(s) IntraMuscular once  insulin lispro (ADMELOG) corrective regimen sliding scale   SubCutaneous Before meals and at bedtime  levothyroxine 125 MICROGram(s) Oral daily  metoprolol tartrate 50 milliGRAM(s) Oral every 12 hours  pantoprazole    Tablet 40 milliGRAM(s) Oral before breakfast  senna 2 Tablet(s) Oral at bedtime  sodium chloride 0.9%. 1000 milliLiter(s) (80 mL/Hr) IV Continuous <Continuous>  tamsulosin 0.4 milliGRAM(s) Oral daily    MEDICATIONS  (PRN):  acetaminophen     Tablet .. 650 milliGRAM(s) Oral every 6 hours PRN Mild Pain (1 - 3), Moderate Pain (4 - 6)  dextrose Oral Gel 15 Gram(s) Oral once PRN Blood Glucose LESS THAN 70 milliGRAM(s)/deciliter  HYDROmorphone  Injectable 0.5 milliGRAM(s) IV Push once PRN Severe Pain (7 - 10)  oxyCODONE    IR 5 milliGRAM(s) Oral every 4 hours PRN Severe Pain (7 - 10)      Vital Signs Last 24 Hrs  T(C): 36.4 (14 Apr 2023 09:10), Max: 36.8 (13 Apr 2023 12:05)  T(F): 97.5 (14 Apr 2023 09:10), Max: 98.3 (13 Apr 2023 13:00)  HR: 63 (14 Apr 2023 09:10) (53 - 79)  BP: 151/80 (14 Apr 2023 09:10) (110/55 - 161/80)  BP(mean): 109 (13 Apr 2023 13:30) (94 - 112)  RR: 18 (14 Apr 2023 09:10) (12 - 18)  SpO2: 97% (14 Apr 2023 09:10) (94% - 99%)    Parameters below as of 14 Apr 2023 09:10  Patient On (Oxygen Delivery Method): room air      Physical Exam:  General Appearance: Appears well, NAD  Pulmonary: Nonlabored breathing, no respiratory distress  Cardiovascular: NSR  Abdomen: Soft, nondistended, TTP LLQ. General scrotal edema, unchanged from post-op, no fluctuance, purulence appreciated.   Extremities: WWP, SCD's in place     I&O's Summary    13 Apr 2023 07:01  -  14 Apr 2023 07:00  --------------------------------------------------------  IN: 1280 mL / OUT: 1122 mL / NET: 158 mL        LABS:                        16.0   8.52  )-----------( 109      ( 14 Apr 2023 06:09 )             46.8     04-14    138  |  104  |  24<H>  ----------------------------<  122<H>  4.2   |  24  |  1.03    Ca    8.5      14 Apr 2023 06:09  Phos  4.0     04-14  Mg     1.9     04-14          CAPILLARY BLOOD GLUCOSE      POCT Blood Glucose.: 113 mg/dL (14 Apr 2023 08:06)  POCT Blood Glucose.: 125 mg/dL (13 Apr 2023 21:46)  POCT Blood Glucose.: 124 mg/dL (13 Apr 2023 17:15)  POCT Blood Glucose.: 108 mg/dL (13 Apr 2023 13:39)        RADIOLOGY & ADDITIONAL STUDIES:  
  patient seen and examined at bedside   with episode of urinary retention for which he saw urologist previously though no definitive diagnosis of BPH             72 Male with PMH of HTN, HLD, DMII, Chronic venous insufficiency, CKD (s/p robotic CABG 2 stents) 9/2020), thyroid cancer s/p thyroidectomy with XRT in 2018 with subsequent hypothyroidism (now on synthroid) presented to [facility] for b/l hernia repair. Patient complaining of increasing urination while on IV Fluids, patient cleared outpatient by cardioglogist for procedure and underwent stress testing. Patient with METS > 4 able to participate in ADLS with vigorous exercise. Patient has close f/u for CKD     Vital Signs Last 24 Hrs  T(C): 36.6 (14 Apr 2023 13:15), Max: 36.7 (13 Apr 2023 16:00)  T(F): 97.8 (14 Apr 2023 13:15), Max: 98 (13 Apr 2023 16:00)  HR: 50 (14 Apr 2023 13:15) (50 - 79)  BP: 137/68 (14 Apr 2023 13:15) (110/55 - 161/80)  BP(mean): --  RR: 18 (14 Apr 2023 13:15) (18 - 18)  SpO2: 98% (14 Apr 2023 13:15) (94% - 98%)    Parameters below as of 14 Apr 2023 13:15  Patient On (Oxygen Delivery Method): room air          PHYSICAL EXAM:    Constitutional: resting comfortably in bed; NAD  HEENT: NC/AT, PER, anicteric sclera, no nasal discharge; MMM  Neck: supple; no JVD or thyromegaly  Respiratory: CTA B/L; no W/R/R, no retractions  Cardiac: +S1/S2; RRR; no M/R/G  Gastrointestinal: soft, NT/ND; no rebound or guarding  Back: spine midline, no bony tenderness or step-offs; no CVAT B/L  Extremities: WWP, no clubbing or cyanosis; no peripheral edema  Musculoskeletal: NROM x4; no joint swelling, tenderness or erythema  Vascular: 2+ radial, DP/PT pulses B/L  Dermatologic: skin warm, dry and intact; no rashes, wounds, or scars  Neurologic: AAOx3; CNII-XII grossly intact; no focal deficits  Psychiatric: affect and characteristics of appearance, verbalizations, behaviors are appropriate  MEDICATIONS  (STANDING):  clopidogrel Tablet 75 milliGRAM(s) Oral daily  dextrose 5%. 1000 milliLiter(s) (100 mL/Hr) IV Continuous <Continuous>  dextrose 5%. 1000 milliLiter(s) (50 mL/Hr) IV Continuous <Continuous>  dextrose 50% Injectable 25 Gram(s) IV Push once  dextrose 50% Injectable 12.5 Gram(s) IV Push once  dextrose 50% Injectable 25 Gram(s) IV Push once  enoxaparin Injectable 40 milliGRAM(s) SubCutaneous every 24 hours  glucagon  Injectable 1 milliGRAM(s) IntraMuscular once  insulin lispro (ADMELOG) corrective regimen sliding scale   SubCutaneous Before meals and at bedtime  levothyroxine 125 MICROGram(s) Oral daily  metoprolol tartrate 50 milliGRAM(s) Oral every 12 hours  pantoprazole    Tablet 40 milliGRAM(s) Oral before breakfast  senna 2 Tablet(s) Oral at bedtime  sodium chloride 0.9%. 1000 milliLiter(s) (80 mL/Hr) IV Continuous <Continuous>  tamsulosin 0.4 milliGRAM(s) Oral daily    MEDICATIONS  (PRN):  acetaminophen     Tablet .. 650 milliGRAM(s) Oral every 6 hours PRN Mild Pain (1 - 3), Moderate Pain (4 - 6)  dextrose Oral Gel 15 Gram(s) Oral once PRN Blood Glucose LESS THAN 70 milliGRAM(s)/deciliter  HYDROmorphone  Injectable 0.5 milliGRAM(s) IV Push once PRN Severe Pain (7 - 10)  oxyCODONE    IR 5 milliGRAM(s) Oral every 4 hours PRN Severe Pain (7 - 10)    Allergies    No Known Drug Allergies  Shrimp (Angioedema)    Intolerances        LABS:                RADIOLOGY & ADDITIONAL TESTS:  Reviewed
STATUS POST:  Laparoscopic inguinal hernia repair        POST OPERATIVE DAY #: 0    SUBJECTIVE:   Patient seen and examined for postop check. LLQ abd pain, controlled by medication. Voiding. -n-v-cp-sob.    Vital Signs Last 24 Hrs  T(C): 36.8 (13 Apr 2023 13:00), Max: 36.8 (13 Apr 2023 12:05)  T(F): 98.3 (13 Apr 2023 13:00), Max: 98.3 (13 Apr 2023 13:00)  HR: 62 (13 Apr 2023 13:41) (58 - 66)  BP: 155/76 (13 Apr 2023 13:30) (132/66 - 159/83)  BP(mean): 109 (13 Apr 2023 13:30) (94 - 112)  RR: 18 (13 Apr 2023 13:41) (12 - 18)  SpO2: 97% (13 Apr 2023 13:41) (95% - 99%)    Parameters below as of 13 Apr 2023 13:41  Patient On (Oxygen Delivery Method): nasal cannula  O2 Flow (L/min): 2      I&O's Summary    13 Apr 2023 07:01  -  13 Apr 2023 14:16  --------------------------------------------------------  IN: 240 mL / OUT: 240 mL / NET: 0 mL        Physical Exam:  General Appearance: Appears well, NAD  Pulmonary: Nonlabored breathing, no respiratory distress  Cardiovascular: NSR  Abdomen: Soft, nondistended, TTP LLQ. Scrotal support in place.  Extremities: WWP, SCD's in place     LABS:

## 2023-04-14 NOTE — DISCHARGE NOTE PROVIDER - NSDCMRMEDTOKEN_GEN_ALL_CORE_FT
Actamin 325 mg oral tablet: 2 tab(s) orally every 6 hours, As needed, Mild Pain (1 - 3) MDD:8 tabs  Adult Aspirin Regimen 81 mg oral delayed release tablet: 1 tab(s) orally once a day  Aspir 81 oral delayed release tablet: 1 orally once a day  Colace 100 mg oral capsule: 1 cap(s) orally every 12 hours  Innerclean oral tablet: 2 tab(s) orally once a day (at bedtime)  Lopressor 50 mg oral tablet: 1 tab(s) orally every 12 hours  metoprolol succinate 50 mg oral tablet, extended release: 1 orally once a day  oxyCODONE 5 mg oral tablet: 1 tab(s) orally every 6 hours as needed for  severe pain MDD: 4  Percocet 5 mg-325 mg oral tablet: 1 tab(s) orally every 6 hours, As needed, Moderate Pain (4 - 6) MDD:4  Plavix 75 mg oral tablet: 1 tab(s) orally once a day  Protonix 40 mg oral delayed release tablet: 1 tab(s) orally once a day (before a meal)  Synthroid 125 mcg (0.125 mg) oral tablet: 1 tab(s) orally once a day

## 2023-04-14 NOTE — DISCHARGE NOTE PROVIDER - NSDCFUADDINST_GEN_ALL_CORE_FT
Please take 650mg of tylenol every 4-6 hours as needed for mild to moderate pain. Do not exceed over 4000mg per day.  Take 1 tablet of 5mg oxycodone every 4-6 hours as needed for severe pain. Do not exceed over 4 tablets  per day.  Take 100mg of colace 1-2 times per day if you experience constipation or if you are taking oxycodone.   Follow up with Dr. Whitehead in 1 week.

## 2023-04-14 NOTE — PROGRESS NOTE ADULT - ASSESSMENT
HTN, HLD, DMII, Chronic venous insufficiency, CKD (s/p robotic CABG 2 stents) 9/2020), thyroid cancer s/p thyroidectomy with XRT in 2018 with subsequent hypothyroidism (now on synthroid)   Patient with METS > 4 able to participate in ADLS with vigorous exercise. Patient has close f/u for CKD       s/p laporoscopin inguinal hernia repair 4/13 without complications   continue home dose synthyroid   HBA1C, continue ISS keep FSG < 180 while in-house   HTN / HLD medications - can restart as tolerated  Anti platelet medications - ASA / Plavix - restart post op, hold per surgery pre op     Urinary retention    Last night bladder scan was 540 and was straight cathed 575.   patient can get Renal US to evaluate for any hydronephrosis and prostate size   continue on flomax, discharge on 30 day supply to prevent outpatient retention   hemodynamically stable otherwise 
72M ith PMH Of HTN, HLD, Chronic venous insufficiency, CAD (s/p robotic CABG 2 stents) 9/2020), thyroid cancer s/p thyroidectomy with XRT in 2018 with subsequent hypothyroidism (now on synthroid) presents for laparoscopic b/l inguinal hernia repair on 4/13, which was uncomplicated. Admitted to tele given cardiac history for post op monitoring. POD1, failed TOV and was SC. Pending second TOV.     Plan:  CC reg diet with sliding scale   Resume home plavix   Flomax   Pending TOV x2   DC fluids once tolerating diet   OOBA/IS  Pain and nausea control  Am labs  
72M ith PMH Of HTN, HLD, Chronic venous insufficiency, CAD (s/p robotic CABG 2 stents) 9/2020), thyroid cancer s/p thyroidectomy with XRT in 2018 with subsequent hypothyroidism (now on synthroid) presents for laparoscopic b/l inguinal hernia repair on 4/13, which was uncomplicated. Admitted to tele given cardiac history for post op monitoring.     Plan:  CC reg diet with sliding scale   Resume all home meds except Plavix  Resume plavix tomorrow   IVF  OOBA/IS  Pain and nausea control  Am labs

## 2023-04-14 NOTE — DISCHARGE NOTE NURSING/CASE MANAGEMENT/SOCIAL WORK - NSDCPEFALRISK_GEN_ALL_CORE
For information on Fall & Injury Prevention, visit: [email or web address] OR  [email or web address] OR  https://www.cdc.gov/steadi/patient.html

## 2023-04-15 ENCOUNTER — TRANSCRIPTION ENCOUNTER (OUTPATIENT)
Age: 73
End: 2023-04-15

## 2023-04-17 ENCOUNTER — TRANSCRIPTION ENCOUNTER (OUTPATIENT)
Age: 73
End: 2023-04-17

## 2023-04-17 ENCOUNTER — APPOINTMENT (OUTPATIENT)
Dept: CARE COORDINATION | Facility: HOME HEALTH | Age: 73
End: 2023-04-17
Payer: MEDICARE

## 2023-04-17 DIAGNOSIS — C73 MALIGNANT NEOPLASM OF THYROID GLAND: ICD-10-CM

## 2023-04-17 DIAGNOSIS — N48.89 OTHER SPECIFIED DISORDERS OF PENIS: ICD-10-CM

## 2023-04-17 DIAGNOSIS — E78.5 HYPERLIPIDEMIA, UNSPECIFIED: ICD-10-CM

## 2023-04-17 DIAGNOSIS — Z09 ENCOUNTER FOR FOLLOW-UP EXAMINATION AFTER COMPLETED TREATMENT FOR CONDITIONS OTHER THAN MALIGNANT NEOPLASM: ICD-10-CM

## 2023-04-17 DIAGNOSIS — I25.10 ATHEROSCLEROTIC HEART DISEASE OF NATIVE CORONARY ARTERY W/OUT ANGINA PECTORIS: ICD-10-CM

## 2023-04-17 DIAGNOSIS — I10 ESSENTIAL (PRIMARY) HYPERTENSION: ICD-10-CM

## 2023-04-17 DIAGNOSIS — Z98.890 OTHER SPECIFIED POSTPROCEDURAL STATES: ICD-10-CM

## 2023-04-17 DIAGNOSIS — E11.9 TYPE 2 DIABETES MELLITUS W/OUT COMPLICATIONS: ICD-10-CM

## 2023-04-17 DIAGNOSIS — Z87.19 OTHER SPECIFIED POSTPROCEDURAL STATES: ICD-10-CM

## 2023-04-17 PROBLEM — I87.2 VENOUS INSUFFICIENCY (CHRONIC) (PERIPHERAL): Chronic | Status: ACTIVE | Noted: 2023-04-12

## 2023-04-17 PROBLEM — R73.03 PREDIABETES: Chronic | Status: ACTIVE | Noted: 2023-04-12

## 2023-04-17 PROCEDURE — 99495 TRANSJ CARE MGMT MOD F2F 14D: CPT | Mod: 95

## 2023-04-17 RX ORDER — DOCUSATE SODIUM 100 MG/1
100 CAPSULE ORAL
Refills: 0 | Status: ACTIVE | COMMUNITY

## 2023-04-17 RX ORDER — OXYCODONE AND ACETAMINOPHEN 5; 325 MG/1; MG/1
5-325 TABLET ORAL
Refills: 0 | Status: ACTIVE | COMMUNITY

## 2023-04-17 NOTE — REVIEW OF SYSTEMS
[Fever] : no fever [Chills] : no chills [Fatigue] : fatigue [Hot Flashes] : no hot flashes [Night Sweats] : no night sweats [Chest Pain] : no chest pain [Palpitations] : no palpitations [Claudication] : no  leg claudication [Lower Ext Edema] : no lower extremity edema [Orthopena] : no orthopnea [Shortness Of Breath] : no shortness of breath [Wheezing] : no wheezing [Cough] : no cough [Dyspnea on Exertion] : not dyspnea on exertion [Abdominal Pain] : no abdominal pain [Nausea] : no nausea [Constipation] : no constipation [Diarrhea] : no diarrhea [Vomiting] : no vomiting [Dysuria] : no dysuria [Incontinence] : no incontinence [Hematuria] : no hematuria [Joint Pain] : no joint pain [Back Pain] : no back pain [Itching] : no itching [Skin Rash] : no skin rash [Headache] : no headache [Dizziness] : no dizziness [Fainting] : no fainting [Memory Loss] : no memory loss [Insomnia] : no insomnia [Anxiety] : no anxiety [Depression] : no depression [Easy Bleeding] : no easy bleeding [Easy Bruising] : no easy bruising [Negative] : ENT [FreeTextEntry8] : swelling of tip of penis

## 2023-04-17 NOTE — HISTORY OF PRESENT ILLNESS
[Home] : at home, [unfilled] , at the time of the visit. [Other Location: e.g. Home (Enter Location, City,State)___] : at [unfilled] [Verbal consent obtained from patient] : the patient, [unfilled] [FreeTextEntry1] : Follow-up for discharge from [facility] for inguinal hernia repair.\par  [de-identified] : Patient is a 72 year old male with a history of HTN, HLD, thyroid cancer s/p thyroidectomy w/ XRT in 2018 with subsequent hypothyroidism (now on synthroid), skin cancer (s/p skin lesion removal). Patient was admitted from 4/13/23 - 4/14/23 to [facility] for a diagnosis of inguinal hernia repair. \par \[facility] chart reviewed and copied as per [facility] Discharge Summary:\par "72M with PMH of HTN, HLD, thyroid cancer s/p thyroidectomy w/ XRT in 2018 with subsequent hypothyroidism (now on synthroid), skin cancer (s/p skin lesion removal) presents for laprascopic b/l inguinal hernia repair with mesh 4/13. Pt was taken to PACU in stable condition. Post op course has been uncomplicated. At the time of discharge, pt is tolerating diet, has return of bowel function, and am able to ambulate freely. Pt also passed his TOV. He is hemodynamically stable and is ready for discharge". \par \par Patient observed via [facility] [facility] and is alert and oriented x 3, in no acute distress. Patient observed sitting comfortably upright in living room chair at time of visit.  Patient states they are feeling "better" today. Patient reports pain post procedure, did not want to take the Percocet at first due to fears of constipation, pain 8/10 when using only Tylenol. Patient decided to try the Percocet and reports the pain went down to a 4/10. Patient states they are eating and drinking well. Patient reports compliance with medications. Patient reports swelling at the tip of the penis due to betaine solution from straight cath procedure done inpatient. Patient states the swelling has improved since discharge but still persists. Patient reports he is urinating normal and having regular bowel movements.  States he intends to call Dr. Whitehead for f/u post surgery and plans to schedule the appointment for later this week. Denies any cough, fever, chills, abdominal pain, palpitations, nausea, vomiting, diarrhea, lightheadedness, dizziness, shortness of breath, or chest pain.\par \par Patient contacted by TCM RN within 48 hours of discharge and d/c instructions reviewed.  Discharge medications were reviewed and reconciled with the current medication list and medications in home. Patient had 48 hour follow up call done by JJ Leon on 4/17/23.\par \par

## 2023-04-17 NOTE — PHYSICAL EXAM
[No Acute Distress] : no acute distress [Well Nourished] : well nourished [Well Developed] : well developed [Well-Appearing] : well-appearing [Normal Sclera/Conjunctiva] : normal sclera/conjunctiva [Normal Outer Ear/Nose] : the outer ears and nose were normal in appearance [No JVD] : no jugular venous distention [No Accessory Muscle Use] : no accessory muscle use [No Varicosities] : no varicosities [No Edema] : there was no peripheral edema [Non-distended] : non-distended [No Joint Swelling] : no joint swelling [No Rash] : no rash [Normal Gait] : normal gait [Normal Affect] : the affect was normal [Normal Insight/Judgement] : insight and judgment were intact [de-identified] : limited due to tele visit  [de-identified] : + trace edema to tip of penis

## 2023-04-26 ENCOUNTER — TRANSCRIPTION ENCOUNTER (OUTPATIENT)
Age: 73
End: 2023-04-26

## 2023-04-28 DIAGNOSIS — Z79.01 LONG TERM (CURRENT) USE OF ANTICOAGULANTS: ICD-10-CM

## 2023-04-28 DIAGNOSIS — I87.2 VENOUS INSUFFICIENCY (CHRONIC) (PERIPHERAL): ICD-10-CM

## 2023-04-28 DIAGNOSIS — K40.20 BILATERAL INGUINAL HERNIA, WITHOUT OBSTRUCTION OR GANGRENE, NOT SPECIFIED AS RECURRENT: ICD-10-CM

## 2023-04-28 DIAGNOSIS — R33.9 RETENTION OF URINE, UNSPECIFIED: ICD-10-CM

## 2023-04-28 DIAGNOSIS — N18.2 CHRONIC KIDNEY DISEASE, STAGE 2 (MILD): ICD-10-CM

## 2023-04-28 DIAGNOSIS — E78.5 HYPERLIPIDEMIA, UNSPECIFIED: ICD-10-CM

## 2023-04-28 DIAGNOSIS — Z79.82 LONG TERM (CURRENT) USE OF ASPIRIN: ICD-10-CM

## 2023-04-28 DIAGNOSIS — Z91.013 ALLERGY TO SEAFOOD: ICD-10-CM

## 2023-04-28 DIAGNOSIS — E11.22 TYPE 2 DIABETES MELLITUS WITH DIABETIC CHRONIC KIDNEY DISEASE: ICD-10-CM

## 2023-04-28 DIAGNOSIS — Z95.1 PRESENCE OF AORTOCORONARY BYPASS GRAFT: ICD-10-CM

## 2023-04-28 DIAGNOSIS — E89.0 POSTPROCEDURAL HYPOTHYROIDISM: ICD-10-CM

## 2023-04-28 DIAGNOSIS — Z95.5 PRESENCE OF CORONARY ANGIOPLASTY IMPLANT AND GRAFT: ICD-10-CM

## 2023-04-28 DIAGNOSIS — I12.9 HYPERTENSIVE CHRONIC KIDNEY DISEASE WITH STAGE 1 THROUGH STAGE 4 CHRONIC KIDNEY DISEASE, OR UNSPECIFIED CHRONIC KIDNEY DISEASE: ICD-10-CM

## 2023-04-28 DIAGNOSIS — Z86.718 PERSONAL HISTORY OF OTHER VENOUS THROMBOSIS AND EMBOLISM: ICD-10-CM

## 2023-04-28 DIAGNOSIS — Z85.850 PERSONAL HISTORY OF MALIGNANT NEOPLASM OF THYROID: ICD-10-CM

## 2023-04-28 DIAGNOSIS — Z85.828 PERSONAL HISTORY OF OTHER MALIGNANT NEOPLASM OF SKIN: ICD-10-CM

## 2023-05-04 ENCOUNTER — TRANSCRIPTION ENCOUNTER (OUTPATIENT)
Age: 73
End: 2023-05-04

## 2023-05-10 ENCOUNTER — TRANSCRIPTION ENCOUNTER (OUTPATIENT)
Age: 73
End: 2023-05-10

## 2023-09-08 NOTE — H&P ADULT - TEMPERATURE IN CELSIUS (DEGREES C)
[FreeTextEntry1] : 50 yo male, hx of HLD, prediabetic referred for colon cancer screening. No family hx of colon cancer. No rectal bleeding. No CP or sob. hx of polyps in past. Current sciactica on left side.  IMP: 1. colon cancer screenint 2. preDM 3. HLD 4. sciatica  PLAN: He was advised to undergo colonoscopy to which he  agreed. The procedure will be performed in [facility] with the assistance of an anesthesiologist. The procedure was explained in detail and he understood the risks of the procedure not limited  to infection, bleeding, perforation, risk of anesthesia and risk of IV site problems,emergency surgery, missed lesions  or non-diagnosis of colon cancer. He  was advised that he could not drive home alone, if the patient chooses to receive sedation. Further diagnostic and treatment recommendations will be based upon the procedure and any biopsies, if they are taken.  pt continuing with PT for LBP 36.6

## 2025-05-25 NOTE — PRE-ANESTHESIA EVALUATION ADULT - NSATTENDATTESTRD_GEN_ALL_CORE
No care due was identified.  Health [facility] Embedded Care Due Messages. Reference number: [id number].   5/25/2025 4:10:58 AM CDT   The patient has been re-examined and I agree with the above assessment or I updated with my findings.

## (undated) DEVICE — SUT VICRYL 0 27" UR-6

## (undated) DEVICE — DRSG STERISTRIPS 0.5 X 4"

## (undated) DEVICE — WARMING BLANKET UPPER ADULT

## (undated) DEVICE — GLV 8 PROTEXIS (WHITE)

## (undated) DEVICE — BLADE SURGICAL #15 CARBON

## (undated) DEVICE — PACK GENERAL LAPAROSCOPY

## (undated) DEVICE — SOL IRR BAG NS 0.9% 3000ML

## (undated) DEVICE — SUT MONOCRYL 4-0 18" PS-2

## (undated) DEVICE — SUT VICRYL 3-0 18" PS-2 UNDYED

## (undated) DEVICE — MARKING PEN W RULER

## (undated) DEVICE — POSITIONER FOAM EGG CRATE ULNAR 2PCS (PINK)

## (undated) DEVICE — TUBING STRYKER PNEUMOSURE HI FLOW INSUFFLATOR

## (undated) DEVICE — VENODYNE/SCD SLEEVE CALF MEDIUM

## (undated) DEVICE — TIP METZENBAUM SCISSOR MONOPOLAR ENDOCUT (ORANGE)